# Patient Record
Sex: MALE | Race: WHITE | Employment: FULL TIME | URBAN - METROPOLITAN AREA
[De-identification: names, ages, dates, MRNs, and addresses within clinical notes are randomized per-mention and may not be internally consistent; named-entity substitution may affect disease eponyms.]

---

## 2017-03-16 ENCOUNTER — OFFICE VISIT (OUTPATIENT)
Dept: INTERNAL MEDICINE CLINIC | Facility: CLINIC | Age: 32
End: 2017-03-16

## 2017-03-16 VITALS
BODY MASS INDEX: 33.37 KG/M2 | SYSTOLIC BLOOD PRESSURE: 142 MMHG | DIASTOLIC BLOOD PRESSURE: 96 MMHG | HEART RATE: 60 BPM | TEMPERATURE: 98 F | HEIGHT: 74 IN | WEIGHT: 260 LBS

## 2017-03-16 DIAGNOSIS — M75.102 TEAR OF LEFT ROTATOR CUFF, UNSPECIFIED TEAR EXTENT: ICD-10-CM

## 2017-03-16 DIAGNOSIS — M54.12 CERVICAL RADICULOPATHY: ICD-10-CM

## 2017-03-16 DIAGNOSIS — M25.512 ACUTE PAIN OF LEFT SHOULDER: Primary | ICD-10-CM

## 2017-03-16 PROCEDURE — 99212 OFFICE O/P EST SF 10 MIN: CPT | Performed by: INTERNAL MEDICINE

## 2017-03-16 PROCEDURE — 99214 OFFICE O/P EST MOD 30 MIN: CPT | Performed by: INTERNAL MEDICINE
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
## 2017-04-06 ENCOUNTER — HOSPITAL ENCOUNTER (OUTPATIENT)
Dept: GENERAL RADIOLOGY | Facility: HOSPITAL | Age: 32
Discharge: HOME | End: 2017-04-06
Attending: INTERNAL MEDICINE
Payer: COMMERCIAL

## 2017-04-06 ENCOUNTER — HOSPITAL ENCOUNTER (OUTPATIENT)
Dept: MRI IMAGING | Facility: HOSPITAL | Age: 32
Discharge: HOME | End: 2017-04-06
Attending: INTERNAL MEDICINE
Payer: COMMERCIAL

## 2017-04-06 DIAGNOSIS — M75.102 TEAR OF LEFT ROTATOR CUFF, UNSPECIFIED TEAR EXTENT: ICD-10-CM

## 2017-04-06 DIAGNOSIS — M25.512 ACUTE PAIN OF LEFT SHOULDER: ICD-10-CM

## 2017-04-06 DIAGNOSIS — M54.12 CERVICAL RADICULOPATHY: ICD-10-CM

## 2017-04-06 PROCEDURE — 72141 MRI NECK SPINE W/O DYE: CPT

## 2017-04-06 PROCEDURE — 73221 MRI JOINT UPR EXTREM W/O DYE: CPT

## 2017-04-06 PROCEDURE — 73030 X-RAY EXAM OF SHOULDER: CPT

## 2017-04-11 ENCOUNTER — TELEPHONE (OUTPATIENT)
Dept: INTERNAL MEDICINE CLINIC | Facility: CLINIC | Age: 32
End: 2017-04-11

## 2017-04-11 NOTE — TELEPHONE ENCOUNTER
Imaging on 4/6/17 reviewed;  Xr of left shoulder shows no acute bony abnormality; MRI left should shows low-grade deltoid strain; no rotator cuff tear; MRI cervical spine minimal DJD; no neural foraminal impingement; pt called and notified of findings; disc

## 2018-06-05 ENCOUNTER — HOSPITAL ENCOUNTER (EMERGENCY)
Facility: HOSPITAL | Age: 33
Discharge: HOME | End: 2018-06-05
Attending: PHYSICIAN ASSISTANT
Payer: COMMERCIAL

## 2018-06-05 ENCOUNTER — TELEPHONE (OUTPATIENT)
Dept: INTERNAL MEDICINE CLINIC | Facility: CLINIC | Age: 33
End: 2018-06-05

## 2018-06-05 VITALS
HEART RATE: 68 BPM | DIASTOLIC BLOOD PRESSURE: 81 MMHG | OXYGEN SATURATION: 98 % | TEMPERATURE: 98 F | SYSTOLIC BLOOD PRESSURE: 140 MMHG | WEIGHT: 250 LBS | HEIGHT: 74 IN | RESPIRATION RATE: 18 BRPM | BODY MASS INDEX: 32.08 KG/M2

## 2018-06-05 DIAGNOSIS — R03.0 ELEVATED BLOOD PRESSURE READING: ICD-10-CM

## 2018-06-05 DIAGNOSIS — R04.0 EPISTAXIS: Primary | ICD-10-CM

## 2018-06-05 PROCEDURE — 99283 EMERGENCY DEPT VISIT LOW MDM: CPT

## 2018-06-05 PROCEDURE — 30901 CONTROL OF NOSEBLEED: CPT

## 2018-06-05 NOTE — TELEPHONE ENCOUNTER
Patient states he has had problems with nosebleeds in the past but today it has been about 1.5 hours and the bleeding has not stopped.  Patient states he has \"it pretty packed\" right now and is driving home but knows that if he takes the towel out it will

## 2018-06-05 NOTE — ED NOTES
Pt states has had a nose bleed for approx 2hrs. Not stopping. Has a history of nose bleeds with cauterization. Denies injury. Denies anticoagulation use.

## 2018-06-05 NOTE — ED PROVIDER NOTES
Patient Seen in: [facility] AND [facility] Emergency Department    History   Patient presents with:  Nose Bleed (nasopharyngeal)    Stated Complaint: Nose Bleed     HPI       60-year-old male presents with chief complaint of right epistaxis. Onset 2 hours ago. 0.6 - 1.2 oz/week     Standard drinks or equivalent: 1 - 2 per week      Review of Systems    Positive for stated complaint: Nose Bleed   Other systems are as noted in HPI. Constitutional and vital signs reviewed.       All other systems reviewed and negat deformity. Neurological: Gross motor movement is intact in all 4 extremities. Patient exhibits normal speech. Skin: Skin is normal to inspection. Warm and dry. No obvious bruising. No obvious rash.     ED Course   Labs Reviewed - No data to display

## 2018-06-05 NOTE — TELEPHONE ENCOUNTER
Pt has had problems with bloody noses in the past  Has one today for the last hour and a half    Transferred patient to triage

## 2018-06-05 NOTE — ED INITIAL ASSESSMENT (HPI)
Patient presents with nose bleed for the past two hours. Patient has a history of nose bleeds with cauterization.

## 2018-06-06 ENCOUNTER — OFFICE VISIT (OUTPATIENT)
Dept: INTERNAL MEDICINE CLINIC | Facility: CLINIC | Age: 33
End: 2018-06-06

## 2018-06-06 VITALS
HEIGHT: 74 IN | SYSTOLIC BLOOD PRESSURE: 140 MMHG | HEART RATE: 70 BPM | BODY MASS INDEX: 33.11 KG/M2 | DIASTOLIC BLOOD PRESSURE: 92 MMHG | TEMPERATURE: 98 F | WEIGHT: 258 LBS

## 2018-06-06 DIAGNOSIS — Z00.00 HEALTHCARE MAINTENANCE: ICD-10-CM

## 2018-06-06 DIAGNOSIS — R04.0 EPISTAXIS: Primary | ICD-10-CM

## 2018-06-06 PROCEDURE — 99213 OFFICE O/P EST LOW 20 MIN: CPT | Performed by: INTERNAL MEDICINE

## 2018-06-06 PROCEDURE — 99212 OFFICE O/P EST SF 10 MIN: CPT | Performed by: INTERNAL MEDICINE

## 2018-06-06 NOTE — PROGRESS NOTES
Svetlana Srinivasan is a 28year old male. HPI:   Patient presents with:  Nose Bleed (nasopharyngeal): Pt has had nosebleeds 2-3 x a week for a couple months. Yesterday he went to the ER for one and had to have it cauterized.        29 y/o M who has had rec oriented x3 in no acute distress  HEENT- EOMI, PERRL  Nose/Mouth/Throat: pharynx without erythema; no oral lesions  Neck/Thyroid: neck supple; no thyromegaly  Cardiovascular: RRR, S1, S2, no S3 or murmur  Respiratory: lungs without crackles or wheezes  Abd

## 2018-06-07 ENCOUNTER — LAB ENCOUNTER (OUTPATIENT)
Dept: LAB | Age: 33
End: 2018-06-07
Attending: INTERNAL MEDICINE
Payer: COMMERCIAL

## 2018-06-07 DIAGNOSIS — Z00.00 HEALTHCARE MAINTENANCE: ICD-10-CM

## 2018-06-07 DIAGNOSIS — R04.0 EPISTAXIS: ICD-10-CM

## 2018-06-07 PROCEDURE — 36415 COLL VENOUS BLD VENIPUNCTURE: CPT

## 2018-06-07 PROCEDURE — 85025 COMPLETE CBC W/AUTO DIFF WBC: CPT

## 2018-06-07 PROCEDURE — 80061 LIPID PANEL: CPT

## 2018-06-07 PROCEDURE — 80053 COMPREHEN METABOLIC PANEL: CPT

## 2018-06-07 PROCEDURE — 84443 ASSAY THYROID STIM HORMONE: CPT

## 2018-06-19 ENCOUNTER — OFFICE VISIT (OUTPATIENT)
Dept: OTOLARYNGOLOGY | Facility: CLINIC | Age: 33
End: 2018-06-19

## 2018-06-19 VITALS
DIASTOLIC BLOOD PRESSURE: 90 MMHG | SYSTOLIC BLOOD PRESSURE: 136 MMHG | HEIGHT: 74 IN | TEMPERATURE: 99 F | WEIGHT: 258 LBS | HEART RATE: 70 BPM | BODY MASS INDEX: 33.11 KG/M2

## 2018-06-19 DIAGNOSIS — R04.0 EPISTAXIS: Primary | ICD-10-CM

## 2018-06-19 PROCEDURE — 99212 OFFICE O/P EST SF 10 MIN: CPT | Performed by: OTOLARYNGOLOGY

## 2018-06-19 PROCEDURE — 30901 CONTROL OF NOSEBLEED: CPT | Performed by: OTOLARYNGOLOGY

## 2018-06-19 PROCEDURE — 99243 OFF/OP CNSLTJ NEW/EST LOW 30: CPT | Performed by: OTOLARYNGOLOGY

## 2018-06-19 NOTE — PROGRESS NOTES
Jaime Cervantes is a 28year old male. Patient presents with:  Epistaxis: Patient present for consult for c/o recurrent nose bleeds to right nostril for past 6 months every couple days. Last nose bleed was 2 nights ago in sleep.  Dull pain to right side of Normal. Parotid gland - Normal. Thyroid gland - Normal.   Psychiatric Normal Orientation - Oriented to time, place, person & situation. Appropriate mood and affect. Lymph Detail Normal Submental. Submandibular.  Anterior cervical. Posterior cervical. Supr

## 2018-06-20 ENCOUNTER — OFFICE VISIT (OUTPATIENT)
Dept: OTOLARYNGOLOGY | Facility: CLINIC | Age: 33
End: 2018-06-20

## 2018-06-20 ENCOUNTER — TELEPHONE (OUTPATIENT)
Dept: OTOLARYNGOLOGY | Facility: CLINIC | Age: 33
End: 2018-06-20

## 2018-06-20 VITALS
HEIGHT: 74 IN | TEMPERATURE: 98 F | WEIGHT: 285 LBS | BODY MASS INDEX: 36.57 KG/M2 | SYSTOLIC BLOOD PRESSURE: 152 MMHG | DIASTOLIC BLOOD PRESSURE: 93 MMHG

## 2018-06-20 DIAGNOSIS — Z01.818 PRE-OP EXAM: Primary | ICD-10-CM

## 2018-06-20 DIAGNOSIS — R04.0 EPISTAXIS: Primary | ICD-10-CM

## 2018-06-20 PROCEDURE — 99212 OFFICE O/P EST SF 10 MIN: CPT | Performed by: OTOLARYNGOLOGY

## 2018-06-20 PROCEDURE — 31231 NASAL ENDOSCOPY DX: CPT | Performed by: OTOLARYNGOLOGY

## 2018-06-20 PROCEDURE — 30901 CONTROL OF NOSEBLEED: CPT | Performed by: OTOLARYNGOLOGY

## 2018-06-20 PROCEDURE — 99213 OFFICE O/P EST LOW 20 MIN: CPT | Performed by: OTOLARYNGOLOGY

## 2018-06-20 NOTE — TELEPHONE ENCOUNTER
Late entry, Per pt pinching fleshy part of nose, pt states bleeding has stopped. Per Dr. Sophia Bay, pt to have nose cauterized in OR.  Advised pt that if nose starts to bleed again, pt to pinch fleshy part of nose, and if bleeding dose not stop after 10-15, pt

## 2018-06-20 NOTE — PROGRESS NOTES
Gorge Kearney is a 28year old male. Patient presents with:   Follow - Up: pt reports after culterization procedure yesterday had 3 nose bleeds out of right nostril, dull pain in the back of head      HISTORY OF PRESENT ILLNESS  6/20/2018  Patient preve 04/22/2011    Per [employer]   • S/P foot surgery, left 2010       Past Surgical History:  2010: MUSCULOSKELETAL SURGERY UNLISTED Left      Comment: left foot surgery      REVIEW OF SYSTEMS    System Neg/Pos Details   Constitutional Negative Fatigue, fever an noted. Prominent vessels are  noted . Active bleeding is noted    PROCEDURE: right nasal endoscopy  performed with topical lidocaine and oxymetazoline. After discussing indication's and potential side effects.  The patient stated that they understood an the fact that he has had 4 different visits for these nosebleeds and has been cauterized 4 times in the office with limited success I recommend we proceed with nasal endoscopy exam under anesthesia and possible electrocautery should he have any further ble

## 2018-06-20 NOTE — TELEPHONE ENCOUNTER
pt called. He was seen yesterday and today to have nose cauterized. pt states his nose is bleeding again. pt advised to call if this did happen. Thank you.

## 2018-06-21 ENCOUNTER — APPOINTMENT (OUTPATIENT)
Dept: LAB | Facility: HOSPITAL | Age: 33
End: 2018-06-21
Attending: OTOLARYNGOLOGY
Payer: COMMERCIAL

## 2018-06-21 DIAGNOSIS — Z01.818 PRE-OP EXAM: ICD-10-CM

## 2018-06-21 PROCEDURE — 85610 PROTHROMBIN TIME: CPT

## 2018-06-21 PROCEDURE — 85730 THROMBOPLASTIN TIME PARTIAL: CPT

## 2018-06-21 PROCEDURE — 85027 COMPLETE CBC AUTOMATED: CPT

## 2018-06-21 PROCEDURE — 80048 BASIC METABOLIC PNL TOTAL CA: CPT

## 2018-06-21 PROCEDURE — 36415 COLL VENOUS BLD VENIPUNCTURE: CPT

## 2018-06-21 NOTE — H&P
Jamal Naranjo is a 28year old male. Patient presents with:   Follow - Up: pt reports after culterization procedure yesterday had 3 nose bleeds out of right nostril, dull pain in the back of head        HISTORY OF PRESENT ILLNESS  6/20/2018  Patient pre age 44)              Past Medical History:   Diagnosis Date   • Essential hypertension     • Obesity 04/22/2011     Per [employer]   • S/P foot surgery, left 2010         Past Surgical History:  2010: MUSCULOSKELETAL SURGERY UNLISTED Left      Comment: left f Nose/Mouth/Throat abNormal  Normal external appearance of the nose septum is deviated  and turbinates are normal with no polyps noted. Prominent vessels are  noted .  Active bleeding is noted    PROCEDURE: right nasal endoscopy  performed with topical lid him to purchase some Afrin nasal spray and should he have another nosebleed soak a cotton ball and put into the affected nares.   Given the fact that he has had 4 different visits for these nosebleeds and has been cauterized 4 times in the office with limit

## 2018-06-22 ENCOUNTER — ANESTHESIA EVENT (OUTPATIENT)
Dept: SURGERY | Facility: HOSPITAL | Age: 33
End: 2018-06-22
Payer: COMMERCIAL

## 2018-06-22 ENCOUNTER — SURGERY (OUTPATIENT)
Age: 33
End: 2018-06-22

## 2018-06-22 ENCOUNTER — TELEPHONE (OUTPATIENT)
Dept: INTERNAL MEDICINE CLINIC | Facility: CLINIC | Age: 33
End: 2018-06-22

## 2018-06-22 ENCOUNTER — ANESTHESIA (OUTPATIENT)
Dept: SURGERY | Facility: HOSPITAL | Age: 33
End: 2018-06-22
Payer: COMMERCIAL

## 2018-06-22 ENCOUNTER — HOSPITAL ENCOUNTER (OUTPATIENT)
Facility: HOSPITAL | Age: 33
Setting detail: HOSPITAL OUTPATIENT SURGERY
Discharge: HOME | End: 2018-06-22
Attending: OTOLARYNGOLOGY | Admitting: OTOLARYNGOLOGY
Payer: COMMERCIAL

## 2018-06-22 VITALS
HEIGHT: 74 IN | RESPIRATION RATE: 13 BRPM | SYSTOLIC BLOOD PRESSURE: 132 MMHG | BODY MASS INDEX: 33.11 KG/M2 | DIASTOLIC BLOOD PRESSURE: 81 MMHG | TEMPERATURE: 98 F | WEIGHT: 258 LBS | HEART RATE: 60 BPM | OXYGEN SATURATION: 99 %

## 2018-06-22 DIAGNOSIS — R04.0 EPISTAXIS: ICD-10-CM

## 2018-06-22 PROCEDURE — 30930 THER FX NASAL INF TURBINATE: CPT | Performed by: OTOLARYNGOLOGY

## 2018-06-22 PROCEDURE — 31238 NSL/SINS NDSC SRG NSL HEMRRG: CPT | Performed by: OTOLARYNGOLOGY

## 2018-06-22 PROCEDURE — 0W3Q8ZZ CONTROL BLEEDING IN RESPIRATORY TRACT, VIA NATURAL OR ARTIFICIAL OPENING ENDOSCOPIC: ICD-10-PCS | Performed by: OTOLARYNGOLOGY

## 2018-06-22 PROCEDURE — 09SL7ZZ REPOSITION NASAL TURBINATE, VIA NATURAL OR ARTIFICIAL OPENING: ICD-10-PCS | Performed by: OTOLARYNGOLOGY

## 2018-06-22 RX ORDER — HYDROMORPHONE HYDROCHLORIDE 4 MG/1
4 TABLET ORAL EVERY 2 HOUR PRN
Status: DISCONTINUED | OUTPATIENT
Start: 2018-06-22 | End: 2018-06-22

## 2018-06-22 RX ORDER — LIDOCAINE HYDROCHLORIDE 10 MG/ML
INJECTION, SOLUTION EPIDURAL; INFILTRATION; INTRACAUDAL; PERINEURAL AS NEEDED
Status: DISCONTINUED | OUTPATIENT
Start: 2018-06-22 | End: 2018-06-22 | Stop reason: SURG

## 2018-06-22 RX ORDER — ONDANSETRON 2 MG/ML
4 INJECTION INTRAMUSCULAR; INTRAVENOUS EVERY 6 HOURS PRN
Status: DISCONTINUED | OUTPATIENT
Start: 2018-06-22 | End: 2018-06-22

## 2018-06-22 RX ORDER — NALOXONE HYDROCHLORIDE 0.4 MG/ML
80 INJECTION, SOLUTION INTRAMUSCULAR; INTRAVENOUS; SUBCUTANEOUS AS NEEDED
Status: DISCONTINUED | OUTPATIENT
Start: 2018-06-22 | End: 2018-06-22

## 2018-06-22 RX ORDER — HYDROCODONE BITARTRATE AND ACETAMINOPHEN 5; 325 MG/1; MG/1
1 TABLET ORAL AS NEEDED
Status: DISCONTINUED | OUTPATIENT
Start: 2018-06-22 | End: 2018-06-22

## 2018-06-22 RX ORDER — HYDROMORPHONE HYDROCHLORIDE 2 MG/1
1 TABLET ORAL EVERY 2 HOUR PRN
Status: DISCONTINUED | OUTPATIENT
Start: 2018-06-22 | End: 2018-06-22

## 2018-06-22 RX ORDER — HYDROMORPHONE HYDROCHLORIDE 1 MG/ML
0.4 INJECTION, SOLUTION INTRAMUSCULAR; INTRAVENOUS; SUBCUTANEOUS
Status: DISCONTINUED | OUTPATIENT
Start: 2018-06-22 | End: 2018-06-22

## 2018-06-22 RX ORDER — FAMOTIDINE 20 MG/1
20 TABLET ORAL ONCE
Status: COMPLETED | OUTPATIENT
Start: 2018-06-22 | End: 2018-06-22

## 2018-06-22 RX ORDER — ONDANSETRON 4 MG/1
4 TABLET, ORALLY DISINTEGRATING ORAL EVERY 6 HOURS PRN
Status: DISCONTINUED | OUTPATIENT
Start: 2018-06-22 | End: 2018-06-22

## 2018-06-22 RX ORDER — ONDANSETRON 2 MG/ML
INJECTION INTRAMUSCULAR; INTRAVENOUS AS NEEDED
Status: DISCONTINUED | OUTPATIENT
Start: 2018-06-22 | End: 2018-06-22 | Stop reason: SURG

## 2018-06-22 RX ORDER — HYDROMORPHONE HYDROCHLORIDE 2 MG/1
2 TABLET ORAL EVERY 2 HOUR PRN
Status: DISCONTINUED | OUTPATIENT
Start: 2018-06-22 | End: 2018-06-22

## 2018-06-22 RX ORDER — METOCLOPRAMIDE 10 MG/1
10 TABLET ORAL ONCE
Status: COMPLETED | OUTPATIENT
Start: 2018-06-22 | End: 2018-06-22

## 2018-06-22 RX ORDER — SODIUM CHLORIDE 0.9 % (FLUSH) 0.9 %
10 SYRINGE (ML) INJECTION AS NEEDED
Status: DISCONTINUED | OUTPATIENT
Start: 2018-06-22 | End: 2018-06-22

## 2018-06-22 RX ORDER — ACETAMINOPHEN 500 MG
1000 TABLET ORAL ONCE
Status: COMPLETED | OUTPATIENT
Start: 2018-06-22 | End: 2018-06-22

## 2018-06-22 RX ORDER — HYDROMORPHONE HYDROCHLORIDE 1 MG/ML
0.6 INJECTION, SOLUTION INTRAMUSCULAR; INTRAVENOUS; SUBCUTANEOUS EVERY 5 MIN PRN
Status: DISCONTINUED | OUTPATIENT
Start: 2018-06-22 | End: 2018-06-22

## 2018-06-22 RX ORDER — DEXAMETHASONE SODIUM PHOSPHATE 4 MG/ML
VIAL (ML) INJECTION AS NEEDED
Status: DISCONTINUED | OUTPATIENT
Start: 2018-06-22 | End: 2018-06-22 | Stop reason: SURG

## 2018-06-22 RX ORDER — SODIUM CHLORIDE, SODIUM LACTATE, POTASSIUM CHLORIDE, CALCIUM CHLORIDE 600; 310; 30; 20 MG/100ML; MG/100ML; MG/100ML; MG/100ML
INJECTION, SOLUTION INTRAVENOUS CONTINUOUS
Status: DISCONTINUED | OUTPATIENT
Start: 2018-06-22 | End: 2018-06-22

## 2018-06-22 RX ORDER — ONDANSETRON 2 MG/ML
4 INJECTION INTRAMUSCULAR; INTRAVENOUS ONCE AS NEEDED
Status: DISCONTINUED | OUTPATIENT
Start: 2018-06-22 | End: 2018-06-22

## 2018-06-22 RX ORDER — HALOPERIDOL 5 MG/ML
0.25 INJECTION INTRAMUSCULAR ONCE AS NEEDED
Status: DISCONTINUED | OUTPATIENT
Start: 2018-06-22 | End: 2018-06-22

## 2018-06-22 RX ORDER — ACETAMINOPHEN 160 MG/5ML
650 SOLUTION ORAL EVERY 4 HOURS PRN
Status: DISCONTINUED | OUTPATIENT
Start: 2018-06-22 | End: 2018-06-22

## 2018-06-22 RX ORDER — ACETAMINOPHEN 325 MG/1
650 TABLET ORAL EVERY 4 HOURS PRN
Status: DISCONTINUED | OUTPATIENT
Start: 2018-06-22 | End: 2018-06-22

## 2018-06-22 RX ORDER — HYDROMORPHONE HYDROCHLORIDE 1 MG/ML
0.4 INJECTION, SOLUTION INTRAMUSCULAR; INTRAVENOUS; SUBCUTANEOUS EVERY 5 MIN PRN
Status: DISCONTINUED | OUTPATIENT
Start: 2018-06-22 | End: 2018-06-22

## 2018-06-22 RX ORDER — HYDROCODONE BITARTRATE AND ACETAMINOPHEN 5; 325 MG/1; MG/1
1 TABLET ORAL EVERY 4 HOURS PRN
Status: DISCONTINUED | OUTPATIENT
Start: 2018-06-22 | End: 2018-06-22

## 2018-06-22 RX ORDER — HYDROCODONE BITARTRATE AND ACETAMINOPHEN 5; 325 MG/1; MG/1
2 TABLET ORAL AS NEEDED
Status: DISCONTINUED | OUTPATIENT
Start: 2018-06-22 | End: 2018-06-22

## 2018-06-22 RX ORDER — MIDAZOLAM HYDROCHLORIDE 1 MG/ML
INJECTION INTRAMUSCULAR; INTRAVENOUS AS NEEDED
Status: DISCONTINUED | OUTPATIENT
Start: 2018-06-22 | End: 2018-06-22 | Stop reason: SURG

## 2018-06-22 RX ORDER — HYDROMORPHONE HYDROCHLORIDE 1 MG/ML
0.2 INJECTION, SOLUTION INTRAMUSCULAR; INTRAVENOUS; SUBCUTANEOUS EVERY 5 MIN PRN
Status: DISCONTINUED | OUTPATIENT
Start: 2018-06-22 | End: 2018-06-22

## 2018-06-22 RX ADMIN — SODIUM CHLORIDE, SODIUM LACTATE, POTASSIUM CHLORIDE, CALCIUM CHLORIDE: 600; 310; 30; 20 INJECTION, SOLUTION INTRAVENOUS at 09:20:00

## 2018-06-22 RX ADMIN — ONDANSETRON 4 MG: 2 INJECTION INTRAMUSCULAR; INTRAVENOUS at 09:21:00

## 2018-06-22 RX ADMIN — SODIUM CHLORIDE, SODIUM LACTATE, POTASSIUM CHLORIDE, CALCIUM CHLORIDE: 600; 310; 30; 20 INJECTION, SOLUTION INTRAVENOUS at 09:35:00

## 2018-06-22 RX ADMIN — MIDAZOLAM HYDROCHLORIDE 2 MG: 1 INJECTION INTRAMUSCULAR; INTRAVENOUS at 09:21:00

## 2018-06-22 RX ADMIN — DEXAMETHASONE SODIUM PHOSPHATE 4 MG: 4 MG/ML VIAL (ML) INJECTION at 09:21:00

## 2018-06-22 RX ADMIN — LIDOCAINE HYDROCHLORIDE 50 MG: 10 INJECTION, SOLUTION EPIDURAL; INFILTRATION; INTRACAUDAL; PERINEURAL at 09:21:00

## 2018-06-22 NOTE — OPERATIVE REPORT
6/22/2018    Harshil Smith      PREOPERATIVE DIAGNOSIS: recurrent epistaxis  POSTOPERATIVE DIAGNOSIS: Same. OPERATIVE PROCEDURE:   eua with bilateral nasal endoscopy and control of epistaxis  ATTENDING SURGEON:   Tenzin Elkins M.D.     ANESTHESIA:  G was 10 cc

## 2018-06-22 NOTE — ANESTHESIA POSTPROCEDURE EVALUATION
Patient: Grant Avina    Procedure Summary     Date:  06/22/18 Room / Location:  [address] [place] [address] MAIN OR 02 / [address] [place] [address] OR    Anesthesia Start:  3217 Anesthesia Stop:      Procedure:  NASAL EPISTAXIS (Bilateral Nose) Diagnosis:       Epistaxis      (Epistaxis [R04.

## 2018-06-22 NOTE — TELEPHONE ENCOUNTER
Pat Sarkar from day surgery pre-op at [facility] to let Dr Susan Gray know that the pt had high blood pressure during surgery. Best call back is [phone]. Tasked to nursing.

## 2018-06-22 NOTE — INTERVAL H&P NOTE
Pre-op Diagnosis: Epistaxis [R04.0]    The above referenced H&P was reviewed by Theresa Mccoy MD on 6/22/2018, the patient was examined and no significant changes have occurred in the patient's condition since the H&P was performed.   I discussed with the p

## 2018-06-22 NOTE — ANESTHESIA POSTPROCEDURE EVALUATION
Patient: Sandi David    Procedure Summary     Date:  06/22/18 Room / Location:  [address] [place] [address] MAIN OR 02 / [address] [facility] MAIN OR    Anesthesia Start:  2963 Anesthesia Stop:      Procedure:  NASAL EPISTAXIS (Bilateral Nose) Diagnosis:       Epistaxis      (Epistaxis [R04.

## 2018-06-22 NOTE — TELEPHONE ENCOUNTER
2nd call from [address]  Pt getting ready to go home  Blood pressure is normal 120/78  Should pt follow up with you at the office?     Dr Odilon Nuñez would like your advice on this       [address] X [phone]

## 2018-06-22 NOTE — ANESTHESIA PROCEDURE NOTES
Airway  Date/Time: 6/22/2018 9:34 AM  Urgency: elective    Airway not difficult    General Information and Staff    Patient location during procedure: OR  Anesthesiologist: Shaneka Max  Resident/CRNA: Deryl Cranker  Performed: anesthesiologist a

## 2018-06-22 NOTE — CONSULTS
Hospitalist was called per order: to consult hospitalist by Dr. Randy Marsh;  patient's bp was running high during surgery and in PACU.   Spoke with hospitalist Dr. Carlee Lee and states that patient should be seen by his primary MD. Dr. Renetta Poe office was later c

## 2018-06-22 NOTE — OR NURSING
Took over care from [place]. Patient's primary doctor,Doctor Francisco, called back and this RN gave patient's recent vital signs and reason for consult by Dr Eldon Correia.  Doctor Franck Caballero stated patient may be discharged and should follow up with him within the next

## 2018-06-22 NOTE — ANESTHESIA PREPROCEDURE EVALUATION
Anesthesia PreOp Note    HPI:     Svetlana Srinivasan is a 28year old male who presents for preoperative consultation requested by: Kd Youssef MD    Date of Surgery: 6/22/2018    Procedure(s):  NASAL EPISTAXIS  Indication: Epistaxis [R04.0]    Relevant P labs reviewed.     Lab Results  Component Value Date   WBC 6.1 06/21/2018   RBC 4.23 (L) 06/21/2018   HGB 13.8 06/21/2018   HCT 40.6 (L) 06/21/2018   MCV 96.1 06/21/2018   MCH 32.6 (H) 06/21/2018   MCHC 33.9 06/21/2018   RDW 13.7 06/21/2018    06/21/

## 2018-06-25 ENCOUNTER — TELEPHONE (OUTPATIENT)
Dept: OTOLARYNGOLOGY | Facility: CLINIC | Age: 33
End: 2018-06-25

## 2018-06-25 NOTE — TELEPHONE ENCOUNTER
Pt is post op day 3 for endoscopic control of epistaxis in OR. Per pt is doing well, no bleeding. Per  pt is not to blow nose until seen post op, pt to start usig OTC saline nasal spray on Wednesday .  Pt advised to contact our office if symptoms

## 2018-06-28 ENCOUNTER — OFFICE VISIT (OUTPATIENT)
Dept: OTOLARYNGOLOGY | Facility: CLINIC | Age: 33
End: 2018-06-28

## 2018-06-28 VITALS
TEMPERATURE: 98 F | DIASTOLIC BLOOD PRESSURE: 92 MMHG | WEIGHT: 258 LBS | SYSTOLIC BLOOD PRESSURE: 143 MMHG | BODY MASS INDEX: 33.11 KG/M2 | HEIGHT: 74 IN

## 2018-06-28 DIAGNOSIS — R04.0 EPISTAXIS: Primary | ICD-10-CM

## 2018-06-28 PROCEDURE — 99212 OFFICE O/P EST SF 10 MIN: CPT | Performed by: OTOLARYNGOLOGY

## 2018-06-28 PROCEDURE — 99024 POSTOP FOLLOW-UP VISIT: CPT | Performed by: OTOLARYNGOLOGY

## 2018-06-28 RX ORDER — ACETAMINOPHEN 500 MG
1000 TABLET ORAL EVERY 6 HOURS PRN
COMMUNITY
End: 2018-08-09

## 2018-06-28 NOTE — PROGRESS NOTES
Eugene Dumonter is a 28year old male.  Patient presents with:  Post-Op: endo control of epistaxis done on 6-22, c/o pain inside nose            Social History  Social History   Marital status:   Spouse name: N/A    Years of education: N/A  Number o

## 2018-07-03 ENCOUNTER — HOSPITAL ENCOUNTER (EMERGENCY)
Facility: HOSPITAL | Age: 33
Discharge: HOME | End: 2018-07-03
Attending: EMERGENCY MEDICINE
Payer: COMMERCIAL

## 2018-07-03 ENCOUNTER — TELEPHONE (OUTPATIENT)
Dept: OTOLARYNGOLOGY | Facility: CLINIC | Age: 33
End: 2018-07-03

## 2018-07-03 VITALS
TEMPERATURE: 98 F | BODY MASS INDEX: 33.11 KG/M2 | HEIGHT: 74 IN | DIASTOLIC BLOOD PRESSURE: 87 MMHG | SYSTOLIC BLOOD PRESSURE: 148 MMHG | RESPIRATION RATE: 18 BRPM | WEIGHT: 258 LBS | HEART RATE: 85 BPM | OXYGEN SATURATION: 98 %

## 2018-07-03 DIAGNOSIS — R04.0 EPISTAXIS: Primary | ICD-10-CM

## 2018-07-03 PROCEDURE — 30903 CONTROL OF NOSEBLEED: CPT

## 2018-07-03 PROCEDURE — 99283 EMERGENCY DEPT VISIT LOW MDM: CPT

## 2018-07-03 RX ORDER — ACETAMINOPHEN AND CODEINE PHOSPHATE 300; 30 MG/1; MG/1
1-2 TABLET ORAL EVERY 4 HOURS PRN
Qty: 10 TABLET | Refills: 0 | Status: SHIPPED | OUTPATIENT
Start: 2018-07-03 | End: 2018-07-10

## 2018-07-03 RX ORDER — CEFUROXIME AXETIL 500 MG/1
250 TABLET ORAL 2 TIMES DAILY
Qty: 14 TABLET | Refills: 0 | Status: SHIPPED | OUTPATIENT
Start: 2018-07-03 | End: 2018-08-09

## 2018-07-03 NOTE — TELEPHONE ENCOUNTER
Patient seen Halina Jackson on 06/29 and had nose cauterized. Patient states nose is currently bleeding again.  Requesting to speak with RN

## 2018-07-03 NOTE — TELEPHONE ENCOUNTER
Per pt states- blood is pouring out of nostril for past half hour, bright red blood. Advised pt will need to go to ER. Per pt will have family take. Pt agreed with triage advised and   informed and no further recommendations.

## 2018-07-03 NOTE — ED PROVIDER NOTES
Patient Seen in: [facility] AND [facility] Emergency Department    History   Patient presents with:  Nose Bleed (nasopharyngeal)    Stated Complaint: nose bleed    HPI    Patient is a 80-year-old male who presents to the emergency department with a chief complai hematoma. Epistaxis is observed. Eyes: Conjunctivae and EOM are normal. Pupils are equal, round, and reactive to light. Neck: Neck supple. Cardiovascular: Normal rate, regular rhythm and normal heart sounds.     Pulmonary/Chest: Effort normal.   [facility]

## 2018-07-03 NOTE — ED INITIAL ASSESSMENT (HPI)
bilat nose bleed, pt had surgery on R nare 10 days ago due to frequent nose bleeds, nose starting bleeding spontaneously this AM, pt unable to control at home, direct pressure applied at this time, pt states bld is dripping down throat.   Pt not on bld thin

## 2018-07-05 ENCOUNTER — TELEPHONE (OUTPATIENT)
Dept: OTOLARYNGOLOGY | Facility: CLINIC | Age: 33
End: 2018-07-05

## 2018-07-05 ENCOUNTER — OFFICE VISIT (OUTPATIENT)
Dept: INTERNAL MEDICINE CLINIC | Facility: CLINIC | Age: 33
End: 2018-07-05

## 2018-07-05 VITALS
HEIGHT: 74 IN | OXYGEN SATURATION: 98 % | TEMPERATURE: 99 F | WEIGHT: 254 LBS | DIASTOLIC BLOOD PRESSURE: 76 MMHG | RESPIRATION RATE: 18 BRPM | BODY MASS INDEX: 32.6 KG/M2 | SYSTOLIC BLOOD PRESSURE: 132 MMHG | HEART RATE: 68 BPM

## 2018-07-05 DIAGNOSIS — R04.0 EPISTAXIS: ICD-10-CM

## 2018-07-05 DIAGNOSIS — I10 BENIGN HYPERTENSION: Primary | ICD-10-CM

## 2018-07-05 PROCEDURE — 99214 OFFICE O/P EST MOD 30 MIN: CPT | Performed by: INTERNAL MEDICINE

## 2018-07-05 PROCEDURE — 99212 OFFICE O/P EST SF 10 MIN: CPT | Performed by: INTERNAL MEDICINE

## 2018-07-05 RX ORDER — LISINOPRIL AND HYDROCHLOROTHIAZIDE 12.5; 1 MG/1; MG/1
1 TABLET ORAL DAILY
Qty: 30 TABLET | Refills: 5 | Status: SHIPPED | OUTPATIENT
Start: 2018-07-05 | End: 2018-11-28

## 2018-07-05 NOTE — TELEPHONE ENCOUNTER
Pt scheduled thru mychart appt 12-5-18 with dr Osmel Dsouza to remove nose packing due to a nosebleed. Pt contacted. Pt stated pt removed the packing himself on 7-4-18 and has appt with pcp today. Pt cxl appt with dr Osmel Dsouza.

## 2018-07-05 NOTE — PROGRESS NOTES
Tracy Souza is a 28year old male.     HPI:   Patient presents with:  HTN: Follow up      29 y/o M with elevated BP and recurrent epistaxis here fro F/U; pt had procedure under care of ENT Dr Leena Reed about 2 weeks ago in effort to control epistaxis; pt needed for Pain.  Disp: 10 tablet Rfl: 0       Allergies:    Penicillins             NAUSEA AND VOMITING              ROS:   Constitutional: no weight loss; no fatigue  Cardiovascular:  Negative for chest pain; negative palpitations  Respiratory:  Negative

## 2018-07-05 NOTE — TELEPHONE ENCOUNTER
, patient of [facility] who had control of epistaxis in OR on 06/28. Pt seen in [address] [place] [address] ER on 07/03 for nose bleed and rhino rocket placed. Pt removed packing on his own yesterday as he states he could not take the \"pain\" .  Pt states when he removed

## 2018-08-09 ENCOUNTER — OFFICE VISIT (OUTPATIENT)
Dept: INTERNAL MEDICINE CLINIC | Facility: CLINIC | Age: 33
End: 2018-08-09
Payer: COMMERCIAL

## 2018-08-09 VITALS
TEMPERATURE: 98 F | HEIGHT: 74 IN | SYSTOLIC BLOOD PRESSURE: 124 MMHG | WEIGHT: 251 LBS | HEART RATE: 70 BPM | BODY MASS INDEX: 32.21 KG/M2 | DIASTOLIC BLOOD PRESSURE: 80 MMHG

## 2018-08-09 DIAGNOSIS — I10 BENIGN HYPERTENSION: Primary | ICD-10-CM

## 2018-08-09 PROCEDURE — 99212 OFFICE O/P EST SF 10 MIN: CPT | Performed by: INTERNAL MEDICINE

## 2018-08-09 PROCEDURE — 99213 OFFICE O/P EST LOW 20 MIN: CPT | Performed by: INTERNAL MEDICINE

## 2018-08-09 NOTE — PROGRESS NOTES
Reanna Salmeron is a 28year old male. HPI:   Patient presents with: Follow - Up: Pt is here to follow up on hypertension.  He is doing well.       27 y/o M with HTN here for F/U;  no CP; no SOB; no headaches; no palpitation; no epistaxis; tolerating l (113.9 kg).   Constitutional: alert and oriented x3 in no acute distress  HEENT- EOMI, PERRL  Nose/Mouth/Throat: pharynx without erythema; no oral lesions  Neck/Thyroid: neck supple; no thyromegaly  Cardiovascular: RRR, S1, S2, no S3 or murmur  Respiratory:

## 2018-11-27 ENCOUNTER — TELEPHONE (OUTPATIENT)
Dept: INTERNAL MEDICINE CLINIC | Facility: CLINIC | Age: 33
End: 2018-11-27

## 2018-11-28 NOTE — TELEPHONE ENCOUNTER
Refill sent 7/5/18 for 6 month supply to [facility]. Patient should still have refills available at pharmacy. Called and spoke to patient. He states Dr Matti Webster told him it was ok to take more than 1 pill a day if he felt like he needed to.    He states h

## 2018-11-29 RX ORDER — LISINOPRIL AND HYDROCHLOROTHIAZIDE 12.5; 1 MG/1; MG/1
1 TABLET ORAL DAILY
Qty: 90 TABLET | Refills: 3 | Status: SHIPPED | OUTPATIENT
Start: 2018-11-29 | End: 2019-02-07

## 2019-02-07 ENCOUNTER — OFFICE VISIT (OUTPATIENT)
Dept: INTERNAL MEDICINE CLINIC | Facility: CLINIC | Age: 34
End: 2019-02-07
Payer: COMMERCIAL

## 2019-02-07 ENCOUNTER — APPOINTMENT (OUTPATIENT)
Dept: LAB | Age: 34
End: 2019-02-07
Attending: INTERNAL MEDICINE
Payer: COMMERCIAL

## 2019-02-07 VITALS
TEMPERATURE: 98 F | BODY MASS INDEX: 32.67 KG/M2 | WEIGHT: 249.19 LBS | HEART RATE: 80 BPM | DIASTOLIC BLOOD PRESSURE: 84 MMHG | HEIGHT: 73.2 IN | SYSTOLIC BLOOD PRESSURE: 130 MMHG

## 2019-02-07 DIAGNOSIS — I10 BENIGN HYPERTENSION: Primary | ICD-10-CM

## 2019-02-07 DIAGNOSIS — I10 BENIGN HYPERTENSION: ICD-10-CM

## 2019-02-07 DIAGNOSIS — R04.0 EPISTAXIS: ICD-10-CM

## 2019-02-07 LAB
ANION GAP SERPL CALC-SCNC: 9 MMOL/L (ref 0–18)
BUN SERPL-MCNC: 24 MG/DL (ref 8–20)
BUN/CREAT SERPL: 27.6 (ref 10–20)
CALCIUM SERPL-MCNC: 9.5 MG/DL (ref 8.5–10.5)
CHLORIDE SERPL-SCNC: 102 MMOL/L (ref 95–110)
CO2 SERPL-SCNC: 25 MMOL/L (ref 22–32)
CREAT SERPL-MCNC: 0.87 MG/DL (ref 0.5–1.5)
GLUCOSE SERPL-MCNC: 96 MG/DL (ref 70–99)
OSMOLALITY UR CALC.SUM OF ELEC: 286 MOSM/KG (ref 275–295)
POTASSIUM SERPL-SCNC: 4.6 MMOL/L (ref 3.3–5.1)
SODIUM SERPL-SCNC: 136 MMOL/L (ref 136–144)

## 2019-02-07 PROCEDURE — 36415 COLL VENOUS BLD VENIPUNCTURE: CPT

## 2019-02-07 PROCEDURE — 80048 BASIC METABOLIC PNL TOTAL CA: CPT

## 2019-02-07 PROCEDURE — 99212 OFFICE O/P EST SF 10 MIN: CPT | Performed by: INTERNAL MEDICINE

## 2019-02-07 PROCEDURE — 99214 OFFICE O/P EST MOD 30 MIN: CPT | Performed by: INTERNAL MEDICINE

## 2019-02-07 RX ORDER — LISINOPRIL AND HYDROCHLOROTHIAZIDE 25; 20 MG/1; MG/1
1 TABLET ORAL DAILY
Qty: 30 TABLET | Refills: 5 | Status: SHIPPED | OUTPATIENT
Start: 2019-02-07 | End: 2019-09-02

## 2019-02-07 NOTE — PROGRESS NOTES
Ramez Granados is a 35year old male.     HPI:   Patient presents with:  Checkup: 6 month check up  ,F/U on BP      36 y/o M with HTN here for F/U;  on lisinopril/ HCTZ 10/12.5 mg po qD; has been increasing to BID when has headache;  no CP; no SOB; no hea Blood pressure 130/84, pulse 80, temperature 98 °F (36.7 °C), temperature source Oral, height 6' 1.2\" (1.859 m), weight 249 lb 3.2 oz (113 kg).   Constitutional: alert and oriented x3 in no acute distress  HEENT- EOMI, PERRL  Nose/Mouth/Throat: pharynx w

## 2019-09-03 RX ORDER — LISINOPRIL AND HYDROCHLOROTHIAZIDE 25; 20 MG/1; MG/1
1 TABLET ORAL DAILY
Qty: 90 TABLET | Refills: 1 | Status: SHIPPED | OUTPATIENT
Start: 2019-09-03 | End: 2020-02-10

## 2019-11-19 NOTE — TELEPHONE ENCOUNTER
Patient called requesting 4 tabs of Amoxicillin for her dental appointment that she has coming up. Patient stated that you have sent it for her in the past.     Patient also asked if She needs to be seen for a visit for her Cholesterol check of if you want her to just do a Lab Only? Last OV 8/3/19    Pending Prescriptions:                       Disp   Refills    amoxicillin (AMOXIL) 500 MG capsule       4 caps*0            Sig: Take 4 capsules 1 hour prior to dental work.       Refilled lisinopril/CTZ 10/12.5 mg po qD #90, 3RF; ERx sent; please call pt

## 2020-02-09 ENCOUNTER — TELEPHONE (OUTPATIENT)
Dept: INTERNAL MEDICINE CLINIC | Facility: CLINIC | Age: 35
End: 2020-02-09

## 2020-02-09 DIAGNOSIS — I10 BENIGN HYPERTENSION: Primary | ICD-10-CM

## 2020-02-09 NOTE — TELEPHONE ENCOUNTER
Dr Ahsan Harrison please place lab orders and advise on refill as pt does not meet protocol.  Then to  to make appointment

## 2020-02-10 RX ORDER — LISINOPRIL AND HYDROCHLOROTHIAZIDE 25; 20 MG/1; MG/1
1 TABLET ORAL DAILY
Qty: 30 TABLET | Refills: 0 | Status: SHIPPED | OUTPATIENT
Start: 2020-02-10 | End: 2020-02-13

## 2020-02-11 NOTE — TELEPHONE ENCOUNTER
Called patient and relayed DR. FAIRBANKS message - verbalized understanding . Has sea with DR. FAIRBANKS Thursday 2/13-reminded patient of getting his blood work done before

## 2020-02-11 NOTE — TELEPHONE ENCOUNTER
ERx sent for lisinopril 20/25 mg po qd #30, no RF    Pt is due for physical exam; Fasting labs ordered; please call patient to schedule physical exam

## 2020-02-12 ENCOUNTER — LAB ENCOUNTER (OUTPATIENT)
Dept: LAB | Facility: HOSPITAL | Age: 35
End: 2020-02-12
Attending: INTERNAL MEDICINE
Payer: COMMERCIAL

## 2020-02-12 DIAGNOSIS — I10 BENIGN HYPERTENSION: ICD-10-CM

## 2020-02-12 LAB
ALBUMIN SERPL-MCNC: 4.7 G/DL (ref 3.4–5)
ALBUMIN/GLOB SERPL: 1.4 {RATIO} (ref 1–2)
ALP LIVER SERPL-CCNC: 85 U/L (ref 45–117)
ALT SERPL-CCNC: 17 U/L (ref 16–61)
ANION GAP SERPL CALC-SCNC: 6 MMOL/L (ref 0–18)
AST SERPL-CCNC: 17 U/L (ref 15–37)
BASOPHILS # BLD AUTO: 0.04 X10(3) UL (ref 0–0.2)
BASOPHILS NFR BLD AUTO: 0.3 %
BILIRUB SERPL-MCNC: 1.2 MG/DL (ref 0.1–2)
BUN BLD-MCNC: 20 MG/DL (ref 7–18)
BUN/CREAT SERPL: 23 (ref 10–20)
CALCIUM BLD-MCNC: 9.6 MG/DL (ref 8.5–10.1)
CHLORIDE SERPL-SCNC: 102 MMOL/L (ref 98–112)
CHOLEST SMN-MCNC: 134 MG/DL (ref ?–200)
CO2 SERPL-SCNC: 28 MMOL/L (ref 21–32)
CREAT BLD-MCNC: 0.87 MG/DL (ref 0.7–1.3)
DEPRECATED RDW RBC AUTO: 45 FL (ref 35.1–46.3)
EOSINOPHIL # BLD AUTO: 0.27 X10(3) UL (ref 0–0.7)
EOSINOPHIL NFR BLD AUTO: 2.3 %
ERYTHROCYTE [DISTWIDTH] IN BLOOD BY AUTOMATED COUNT: 12.7 % (ref 11–15)
GLOBULIN PLAS-MCNC: 3.4 G/DL (ref 2.8–4.4)
GLUCOSE BLD-MCNC: 81 MG/DL (ref 70–99)
HCT VFR BLD AUTO: 42.2 % (ref 39–53)
HDLC SERPL-MCNC: 53 MG/DL (ref 40–59)
HGB BLD-MCNC: 14.5 G/DL (ref 13–17.5)
IMM GRANULOCYTES # BLD AUTO: 0.03 X10(3) UL (ref 0–1)
IMM GRANULOCYTES NFR BLD: 0.3 %
LDLC SERPL CALC-MCNC: 66 MG/DL (ref ?–100)
LYMPHOCYTES # BLD AUTO: 3.36 X10(3) UL (ref 1–4)
LYMPHOCYTES NFR BLD AUTO: 29 %
M PROTEIN MFR SERPL ELPH: 8.1 G/DL (ref 6.4–8.2)
MCH RBC QN AUTO: 33.3 PG (ref 26–34)
MCHC RBC AUTO-ENTMCNC: 34.4 G/DL (ref 31–37)
MCV RBC AUTO: 96.8 FL (ref 80–100)
MONOCYTES # BLD AUTO: 0.8 X10(3) UL (ref 0.1–1)
MONOCYTES NFR BLD AUTO: 6.9 %
NEUTROPHILS # BLD AUTO: 7.1 X10 (3) UL (ref 1.5–7.7)
NEUTROPHILS # BLD AUTO: 7.1 X10(3) UL (ref 1.5–7.7)
NEUTROPHILS NFR BLD AUTO: 61.2 %
NONHDLC SERPL-MCNC: 81 MG/DL (ref ?–130)
OSMOLALITY SERPL CALC.SUM OF ELEC: 284 MOSM/KG (ref 275–295)
PATIENT FASTING Y/N/NP: YES
PATIENT FASTING Y/N/NP: YES
PLATELET # BLD AUTO: 189 10(3)UL (ref 150–450)
POTASSIUM SERPL-SCNC: 3.9 MMOL/L (ref 3.5–5.1)
RBC # BLD AUTO: 4.36 X10(6)UL (ref 4.3–5.7)
SODIUM SERPL-SCNC: 136 MMOL/L (ref 136–145)
TRIGL SERPL-MCNC: 75 MG/DL (ref 30–149)
TSI SER-ACNC: 1.35 MIU/ML (ref 0.36–3.74)
VLDLC SERPL CALC-MCNC: 15 MG/DL (ref 0–30)
WBC # BLD AUTO: 11.6 X10(3) UL (ref 4–11)

## 2020-02-12 PROCEDURE — 80061 LIPID PANEL: CPT

## 2020-02-12 PROCEDURE — 36415 COLL VENOUS BLD VENIPUNCTURE: CPT

## 2020-02-12 PROCEDURE — 85025 COMPLETE CBC W/AUTO DIFF WBC: CPT

## 2020-02-12 PROCEDURE — 84443 ASSAY THYROID STIM HORMONE: CPT

## 2020-02-12 PROCEDURE — 80053 COMPREHEN METABOLIC PANEL: CPT

## 2020-02-13 ENCOUNTER — OFFICE VISIT (OUTPATIENT)
Dept: INTERNAL MEDICINE CLINIC | Facility: CLINIC | Age: 35
End: 2020-02-13
Payer: COMMERCIAL

## 2020-02-13 VITALS
WEIGHT: 259 LBS | TEMPERATURE: 99 F | BODY MASS INDEX: 33.24 KG/M2 | HEART RATE: 65 BPM | SYSTOLIC BLOOD PRESSURE: 122 MMHG | OXYGEN SATURATION: 99 % | DIASTOLIC BLOOD PRESSURE: 78 MMHG | HEIGHT: 74 IN

## 2020-02-13 DIAGNOSIS — I10 BENIGN HYPERTENSION: ICD-10-CM

## 2020-02-13 DIAGNOSIS — Z00.00 PHYSICAL EXAM, ANNUAL: Primary | ICD-10-CM

## 2020-02-13 PROCEDURE — 99395 PREV VISIT EST AGE 18-39: CPT | Performed by: INTERNAL MEDICINE

## 2020-02-13 RX ORDER — LISINOPRIL AND HYDROCHLOROTHIAZIDE 25; 20 MG/1; MG/1
1 TABLET ORAL DAILY
Qty: 90 TABLET | Refills: 3 | Status: SHIPPED | OUTPATIENT
Start: 2020-02-13 | End: 2021-02-15

## 2020-02-13 RX ORDER — LISINOPRIL AND HYDROCHLOROTHIAZIDE 25; 20 MG/1; MG/1
1 TABLET ORAL DAILY
Qty: 30 TABLET | Refills: 11 | Status: SHIPPED | OUTPATIENT
Start: 2020-02-13 | End: 2021-02-15

## 2020-02-13 NOTE — PROGRESS NOTES
Ivis Mckay is a [address] old male.     HPI:   Patient presents with:  Medication Request      [address] y/o M here for physical exam; he has HTN for which he takes lisinopril/ HCTZ 20/25 mg po qD;  no CP; no SOB; no headaches; no palpitation        HISTORY: polyphagia  Gastrointestinal:  Negative for abdominal pain, constipation, decreased appetite, diarrhea and vomiting; no melena or hematochezia  Musculoskeletal:  Negative for arthralgias or myalgias  Genitourinary:  Negative for dysuria or polyuria  Hema/L not have hypermobile joints and does not meet criteria at this time; also had family members taller than 6' 5\" (Marfan's?)        RTC 1 year  Cell [phone]  Results to My Chart            Orders This Visit:  No orders of the defined types were placed

## 2020-03-19 RX ORDER — LISINOPRIL AND HYDROCHLOROTHIAZIDE 25; 20 MG/1; MG/1
1 TABLET ORAL DAILY
Qty: 30 TABLET | Refills: 0 | OUTPATIENT
Start: 2020-03-19

## 2020-11-02 ENCOUNTER — PATIENT MESSAGE (OUTPATIENT)
Dept: INTERNAL MEDICINE CLINIC | Facility: CLINIC | Age: 35
End: 2020-11-02

## 2020-11-03 NOTE — TELEPHONE ENCOUNTER
From: Kaley Hernandez  To: Richy King MD  Sent: 11/2/2020 10:34 AM CST  Subject: Referral Request    i'm looking into getting a vasectomy and am wondering if there's anyone you recommend for that procedure. thanks in advance for your help!

## 2021-02-15 ENCOUNTER — OFFICE VISIT (OUTPATIENT)
Dept: INTERNAL MEDICINE CLINIC | Facility: CLINIC | Age: 36
End: 2021-02-15
Payer: COMMERCIAL

## 2021-02-15 VITALS
WEIGHT: 264.19 LBS | HEIGHT: 73.5 IN | OXYGEN SATURATION: 99 % | BODY MASS INDEX: 34.27 KG/M2 | DIASTOLIC BLOOD PRESSURE: 80 MMHG | SYSTOLIC BLOOD PRESSURE: 110 MMHG | HEART RATE: 66 BPM | TEMPERATURE: 98 F

## 2021-02-15 DIAGNOSIS — R19.7 DIARRHEA, UNSPECIFIED TYPE: ICD-10-CM

## 2021-02-15 DIAGNOSIS — R10.11 RUQ ABDOMINAL PAIN: ICD-10-CM

## 2021-02-15 DIAGNOSIS — Z82.49 FAMILY HISTORY OF BRAIN ANEURYSM: ICD-10-CM

## 2021-02-15 DIAGNOSIS — Z00.00 PHYSICAL EXAM, ANNUAL: Primary | ICD-10-CM

## 2021-02-15 DIAGNOSIS — I10 BENIGN HYPERTENSION: ICD-10-CM

## 2021-02-15 PROCEDURE — 3079F DIAST BP 80-89 MM HG: CPT | Performed by: INTERNAL MEDICINE

## 2021-02-15 PROCEDURE — 3008F BODY MASS INDEX DOCD: CPT | Performed by: INTERNAL MEDICINE

## 2021-02-15 PROCEDURE — 99395 PREV VISIT EST AGE 18-39: CPT | Performed by: INTERNAL MEDICINE

## 2021-02-15 PROCEDURE — 3074F SYST BP LT 130 MM HG: CPT | Performed by: INTERNAL MEDICINE

## 2021-02-15 RX ORDER — LISINOPRIL AND HYDROCHLOROTHIAZIDE 25; 20 MG/1; MG/1
1 TABLET ORAL DAILY
Qty: 90 TABLET | Refills: 3 | Status: SHIPPED | OUTPATIENT
Start: 2021-02-15 | End: 2021-07-08

## 2021-02-15 NOTE — PROGRESS NOTES
Harshil Smith is a 28year old male.     HPI:   Patient presents with:  Physical  Medication Request: pending      27 y/o M here for physical exam; on lisinopril/ HCTZ 20/25 mg po every day;  no CP; no SOB; no headaches; no palpitation; c/o RUQ abdominal 20-25 MG Oral Tab Take 1 tablet by mouth daily.  90 tablet 3       Allergies:    Penicillins             NAUSEA AND VOMITING            ROS:   Constitutional: no weight loss; no fatigue  ENMT:  Negative for ear drainage, hearing loss and nasal drainage  Eye po qD     RUQ abdominal pain  1 month h/o pain occurs one hour after eating dinner; improves with walking; radiates to right flank; described as stabbing; lasts 1-2 hours; occurring daily; he stopped eating red meat ne month ago due to facial flushing when

## 2021-03-05 ENCOUNTER — LAB ENCOUNTER (OUTPATIENT)
Dept: LAB | Facility: HOSPITAL | Age: 36
End: 2021-03-05
Attending: INTERNAL MEDICINE
Payer: COMMERCIAL

## 2021-03-05 ENCOUNTER — HOSPITAL ENCOUNTER (OUTPATIENT)
Dept: CT IMAGING | Facility: HOSPITAL | Age: 36
Discharge: HOME | End: 2021-03-05
Attending: INTERNAL MEDICINE
Payer: COMMERCIAL

## 2021-03-05 ENCOUNTER — HOSPITAL ENCOUNTER (OUTPATIENT)
Dept: ULTRASOUND IMAGING | Facility: HOSPITAL | Age: 36
Discharge: HOME | End: 2021-03-05
Attending: INTERNAL MEDICINE
Payer: COMMERCIAL

## 2021-03-05 DIAGNOSIS — I10 BENIGN HYPERTENSION: ICD-10-CM

## 2021-03-05 DIAGNOSIS — Z00.00 PHYSICAL EXAM, ANNUAL: ICD-10-CM

## 2021-03-05 DIAGNOSIS — R10.11 RUQ ABDOMINAL PAIN: ICD-10-CM

## 2021-03-05 DIAGNOSIS — R19.7 DIARRHEA, UNSPECIFIED TYPE: ICD-10-CM

## 2021-03-05 DIAGNOSIS — Z82.49 FAMILY HISTORY OF BRAIN ANEURYSM: ICD-10-CM

## 2021-03-05 LAB
ALBUMIN SERPL-MCNC: 4.3 G/DL (ref 3.4–5)
ALBUMIN/GLOB SERPL: 1.2 {RATIO} (ref 1–2)
ALP LIVER SERPL-CCNC: 105 U/L
ALT SERPL-CCNC: 26 U/L
ANION GAP SERPL CALC-SCNC: 4 MMOL/L (ref 0–18)
AST SERPL-CCNC: 19 U/L (ref 15–37)
BASOPHILS # BLD AUTO: 0.03 X10(3) UL (ref 0–0.2)
BASOPHILS NFR BLD AUTO: 0.4 %
BILIRUB SERPL-MCNC: 0.9 MG/DL (ref 0.1–2)
BILIRUB UR QL: NEGATIVE
BUN BLD-MCNC: 23 MG/DL (ref 7–18)
BUN/CREAT SERPL: 24.2 (ref 10–20)
CALCIUM BLD-MCNC: 9.6 MG/DL (ref 8.5–10.1)
CHLORIDE SERPL-SCNC: 104 MMOL/L (ref 98–112)
CHOLEST SMN-MCNC: 133 MG/DL (ref ?–200)
CLARITY UR: CLEAR
CO2 SERPL-SCNC: 29 MMOL/L (ref 21–32)
COLOR UR: YELLOW
CREAT BLD-MCNC: 0.8 MG/DL
CREAT BLD-MCNC: 0.95 MG/DL
DEPRECATED RDW RBC AUTO: 46.6 FL (ref 35.1–46.3)
EOSINOPHIL # BLD AUTO: 0.33 X10(3) UL (ref 0–0.7)
EOSINOPHIL NFR BLD AUTO: 4.5 %
ERYTHROCYTE [DISTWIDTH] IN BLOOD BY AUTOMATED COUNT: 13.1 % (ref 11–15)
GLOBULIN PLAS-MCNC: 3.6 G/DL (ref 2.8–4.4)
GLUCOSE BLD-MCNC: 98 MG/DL (ref 70–99)
GLUCOSE UR-MCNC: NEGATIVE MG/DL
HCT VFR BLD AUTO: 42.4 %
HDLC SERPL-MCNC: 53 MG/DL (ref 40–59)
HGB BLD-MCNC: 14.4 G/DL
HGB UR QL STRIP.AUTO: NEGATIVE
IGA SERPL-MCNC: 139 MG/DL (ref 70–312)
IMM GRANULOCYTES # BLD AUTO: 0.03 X10(3) UL (ref 0–1)
IMM GRANULOCYTES NFR BLD: 0.4 %
KETONES UR-MCNC: NEGATIVE MG/DL
LDLC SERPL CALC-MCNC: 71 MG/DL (ref ?–100)
LEUKOCYTE ESTERASE UR QL STRIP.AUTO: NEGATIVE
LYMPHOCYTES # BLD AUTO: 1.83 X10(3) UL (ref 1–4)
LYMPHOCYTES NFR BLD AUTO: 25 %
M PROTEIN MFR SERPL ELPH: 7.9 G/DL (ref 6.4–8.2)
MCH RBC QN AUTO: 33.5 PG (ref 26–34)
MCHC RBC AUTO-ENTMCNC: 34 G/DL (ref 31–37)
MCV RBC AUTO: 98.6 FL
MONOCYTES # BLD AUTO: 0.69 X10(3) UL (ref 0.1–1)
MONOCYTES NFR BLD AUTO: 9.4 %
NEUTROPHILS # BLD AUTO: 4.42 X10 (3) UL (ref 1.5–7.7)
NEUTROPHILS # BLD AUTO: 4.42 X10(3) UL (ref 1.5–7.7)
NEUTROPHILS NFR BLD AUTO: 60.3 %
NITRITE UR QL STRIP.AUTO: NEGATIVE
NONHDLC SERPL-MCNC: 80 MG/DL (ref ?–130)
OSMOLALITY SERPL CALC.SUM OF ELEC: 288 MOSM/KG (ref 275–295)
PATIENT FASTING Y/N/NP: YES
PATIENT FASTING Y/N/NP: YES
PH UR: 6 [PH] (ref 5–8)
PLATELET # BLD AUTO: 190 10(3)UL (ref 150–450)
POTASSIUM SERPL-SCNC: 4.7 MMOL/L (ref 3.5–5.1)
PROT UR-MCNC: NEGATIVE MG/DL
RBC # BLD AUTO: 4.3 X10(6)UL
SODIUM SERPL-SCNC: 137 MMOL/L (ref 136–145)
SP GR UR STRIP: >1.03 (ref 1–1.03)
TRIGL SERPL-MCNC: 46 MG/DL (ref 30–149)
TSI SER-ACNC: 1.62 MIU/ML (ref 0.36–3.74)
UROBILINOGEN UR STRIP-ACNC: <2
VLDLC SERPL CALC-MCNC: 9 MG/DL (ref 0–30)
WBC # BLD AUTO: 7.3 X10(3) UL (ref 4–11)

## 2021-03-05 PROCEDURE — 81003 URINALYSIS AUTO W/O SCOPE: CPT

## 2021-03-05 PROCEDURE — 82565 ASSAY OF CREATININE: CPT

## 2021-03-05 PROCEDURE — 70496 CT ANGIOGRAPHY HEAD: CPT | Performed by: INTERNAL MEDICINE

## 2021-03-05 PROCEDURE — 85025 COMPLETE CBC W/AUTO DIFF WBC: CPT

## 2021-03-05 PROCEDURE — 76705 ECHO EXAM OF ABDOMEN: CPT | Performed by: INTERNAL MEDICINE

## 2021-03-05 PROCEDURE — 80053 COMPREHEN METABOLIC PANEL: CPT

## 2021-03-05 PROCEDURE — 83516 IMMUNOASSAY NONANTIBODY: CPT

## 2021-03-05 PROCEDURE — 84443 ASSAY THYROID STIM HORMONE: CPT

## 2021-03-05 PROCEDURE — 80061 LIPID PANEL: CPT

## 2021-03-05 PROCEDURE — 36415 COLL VENOUS BLD VENIPUNCTURE: CPT

## 2021-03-05 PROCEDURE — 82784 ASSAY IGA/IGD/IGG/IGM EACH: CPT

## 2021-03-09 ENCOUNTER — TELEPHONE (OUTPATIENT)
Dept: INTERNAL MEDICINE CLINIC | Facility: CLINIC | Age: 36
End: 2021-03-09

## 2021-03-09 DIAGNOSIS — R10.11 RUQ ABDOMINAL PAIN: Primary | ICD-10-CM

## 2021-03-09 LAB — TTG IGA SER-ACNC: 0.3 U/ML (ref ?–7)

## 2021-03-09 NOTE — TELEPHONE ENCOUNTER
Pt asked that Dr Placido Cary please call him to discuss results of tests taken last week  Tasked to nursing

## 2021-03-11 NOTE — TELEPHONE ENCOUNTER
Labs OK  RUQ ultrasound with +heaptic steatosis  CTA brain neg aneurysm    Imp- post-prandial RUQ pain    Rec- HIDA scan    Pt called

## 2021-03-15 ENCOUNTER — OFFICE VISIT (OUTPATIENT)
Dept: SURGERY | Facility: CLINIC | Age: 36
End: 2021-03-15
Payer: COMMERCIAL

## 2021-03-15 VITALS
HEART RATE: 76 BPM | BODY MASS INDEX: 33.37 KG/M2 | HEIGHT: 74 IN | SYSTOLIC BLOOD PRESSURE: 113 MMHG | WEIGHT: 260 LBS | DIASTOLIC BLOOD PRESSURE: 76 MMHG

## 2021-03-15 DIAGNOSIS — Z30.09 VASECTOMY EVALUATION: Primary | ICD-10-CM

## 2021-03-15 PROCEDURE — 3008F BODY MASS INDEX DOCD: CPT | Performed by: UROLOGY

## 2021-03-15 PROCEDURE — 3078F DIAST BP <80 MM HG: CPT | Performed by: UROLOGY

## 2021-03-15 PROCEDURE — 3074F SYST BP LT 130 MM HG: CPT | Performed by: UROLOGY

## 2021-03-15 PROCEDURE — 99244 OFF/OP CNSLTJ NEW/EST MOD 40: CPT | Performed by: UROLOGY

## 2021-03-15 NOTE — PROGRESS NOTES
[facility]  Initial Office Consultation    HPI:   Reanna Salmeron is a 28year old male here today for consultation at the request of, and a copy of this note will be sent to, Ivone Sylvester MD.    Patient presents today in consultation fo not present. Left: Mass, tenderness or swelling not present. Epididymis:      Right: Normal.      Left: Normal.      Comments: Thick scrotal skin with a good amount of subcutaneous fat. Thick spermatic cords bilaterally.   Normal spermatic cor with in vitro fertilization. These options are not always successful, and they may be expensive. · The rates of surgical complications such as symptomatic hematoma and infection are 1-2%.     · Chronic scrotal pain associated with negative impact on qual

## 2021-03-17 ENCOUNTER — TELEPHONE (OUTPATIENT)
Dept: SURGERY | Facility: CLINIC | Age: 36
End: 2021-03-17

## 2021-05-01 DIAGNOSIS — I10 BENIGN HYPERTENSION: ICD-10-CM

## 2021-05-01 DIAGNOSIS — Z00.00 PHYSICAL EXAM, ANNUAL: ICD-10-CM

## 2021-05-03 RX ORDER — LISINOPRIL AND HYDROCHLOROTHIAZIDE 25; 20 MG/1; MG/1
1 TABLET ORAL DAILY
Qty: 30 TABLET | Refills: 0 | OUTPATIENT
Start: 2021-05-03

## 2021-05-03 NOTE — TELEPHONE ENCOUNTER
Current refill request refused due to refill is either a duplicate request or has active refills at the pharmacy. Check previous templates.     Requested Prescriptions     Refused Prescriptions Disp Refills   • LISINOPRIL-HYDROCHLOROTHIAZIDE 20-25 MG Oral

## 2021-07-08 DIAGNOSIS — Z00.00 PHYSICAL EXAM, ANNUAL: ICD-10-CM

## 2021-07-08 DIAGNOSIS — I10 BENIGN HYPERTENSION: ICD-10-CM

## 2021-07-08 RX ORDER — AMLODIPINE BESYLATE 5 MG/1
5 TABLET ORAL DAILY
Qty: 30 TABLET | Refills: 5 | Status: SHIPPED | OUTPATIENT
Start: 2021-07-08 | End: 2021-08-09

## 2021-07-08 NOTE — TELEPHONE ENCOUNTER
Pt requesting change in blood pressure medication:  Lisinopril/hydrochlorothiazide  Pt is having stomach issues and believes it is related, pt stopped medication two days ago and stomach issues are going away    Can an alternative medication be prescribed?

## 2021-07-08 NOTE — TELEPHONE ENCOUNTER
Imp- HTN    Stop lisinopril/ hydrochlorothiazide 20/25 mg daily due to side effects of RUQ pain    rec- start amlodipine 5 mg daily    ERx sent; pt called

## 2021-07-08 NOTE — TELEPHONE ENCOUNTER
Spoke with patient who reports symptoms started around [place]/New Years. He was seen in February and had [address] for these symptoms. He was waiting to see if symptoms would improve but they have not.  He reports it feels like his gallbladder is \"being stabbe

## 2021-08-09 RX ORDER — LISINOPRIL AND HYDROCHLOROTHIAZIDE 25; 20 MG/1; MG/1
1 TABLET ORAL DAILY
Qty: 90 TABLET | Refills: 3 | Status: SHIPPED | OUTPATIENT
Start: 2021-08-09

## 2021-08-09 NOTE — TELEPHONE ENCOUNTER
To Dr. Jose Mijares to advise-----    Per 7/8 TE, pt was switched to amlodipine from lisinopril hydrochlorothiazide due to stomach problems. Spoke to pt, reports he did not feel right on the amlodipine and had a lot of swelling in legs and hands.  He stopped this medi

## 2021-08-09 NOTE — TELEPHONE ENCOUNTER
After several days of taking amlodipine, pt felt terrible and stopped taking med     Went back to  lisinopril/hydrochlorothiazide & has one pill left for tomorrow    Requests refill at the [place] [phone] on  [address] in [facility]    Pt can be reached a

## 2021-11-17 ENCOUNTER — TELEPHONE (OUTPATIENT)
Dept: INTERNAL MEDICINE CLINIC | Facility: CLINIC | Age: 36
End: 2021-11-17

## 2021-11-17 ENCOUNTER — HOSPITAL ENCOUNTER (EMERGENCY)
Facility: HOSPITAL | Age: 36
Discharge: HOME | End: 2021-11-17
Attending: EMERGENCY MEDICINE
Payer: COMMERCIAL

## 2021-11-17 VITALS
TEMPERATURE: 98 F | HEIGHT: 74 IN | RESPIRATION RATE: 21 BRPM | SYSTOLIC BLOOD PRESSURE: 124 MMHG | OXYGEN SATURATION: 95 % | BODY MASS INDEX: 34.01 KG/M2 | HEART RATE: 86 BPM | WEIGHT: 265 LBS | DIASTOLIC BLOOD PRESSURE: 81 MMHG

## 2021-11-17 DIAGNOSIS — R11.2 INTRACTABLE VOMITING WITH NAUSEA, UNSPECIFIED VOMITING TYPE: ICD-10-CM

## 2021-11-17 DIAGNOSIS — U07.1 COVID-19 VIRUS INFECTION: Primary | ICD-10-CM

## 2021-11-17 PROCEDURE — 85025 COMPLETE CBC W/AUTO DIFF WBC: CPT

## 2021-11-17 PROCEDURE — 96374 THER/PROPH/DIAG INJ IV PUSH: CPT

## 2021-11-17 PROCEDURE — 80048 BASIC METABOLIC PNL TOTAL CA: CPT | Performed by: EMERGENCY MEDICINE

## 2021-11-17 PROCEDURE — 80048 BASIC METABOLIC PNL TOTAL CA: CPT

## 2021-11-17 PROCEDURE — 85025 COMPLETE CBC W/AUTO DIFF WBC: CPT | Performed by: EMERGENCY MEDICINE

## 2021-11-17 PROCEDURE — 96361 HYDRATE IV INFUSION ADD-ON: CPT

## 2021-11-17 PROCEDURE — 99284 EMERGENCY DEPT VISIT MOD MDM: CPT

## 2021-11-17 RX ORDER — ONDANSETRON 2 MG/ML
4 INJECTION INTRAMUSCULAR; INTRAVENOUS ONCE
Status: COMPLETED | OUTPATIENT
Start: 2021-11-17 | End: 2021-11-17

## 2021-11-17 RX ORDER — ONDANSETRON 4 MG/1
4 TABLET, ORALLY DISINTEGRATING ORAL EVERY 4 HOURS PRN
Qty: 10 TABLET | Refills: 0 | Status: SHIPPED | OUTPATIENT
Start: 2021-11-17 | End: 2021-11-24

## 2021-11-17 NOTE — ED PROVIDER NOTES
Patient Seen in: [facility] AND [facility] Emergency Department      History   Patient presents with:  Covid  Nausea/vomiting    Stated Complaint: covid    Subjective:   HPI    The patient is a 15-year-old male with a history of hypertension who has had mild cou not ill-appearing. HENT:      Head: Normocephalic and atraumatic.       Nose: Nose normal.      Mouth/Throat:      Mouth: Mucous membranes are moist.   Eyes:      Conjunctiva/sclera: Conjunctivae normal.      Pupils: Pupils are equal, round, and reactive ------                     CBC W/ DIFFERENTIAL[[id number]]          Abnormal            Final result                 Please view results for these tests on the individual orders.           Patient feels better after IV fluids and Zofran, vital signs

## 2021-11-17 NOTE — TELEPHONE ENCOUNTER
Pt received PAB infusion at [address] [place] [address] on 11/17 for COVID-19. Please follow-up with pt for post-infusion assessment and home monitoring if needed. Thank you.

## 2022-06-13 ENCOUNTER — TELEPHONE (OUTPATIENT)
Dept: SURGERY | Facility: CLINIC | Age: 37
End: 2022-06-13

## 2022-06-13 NOTE — TELEPHONE ENCOUNTER
L/VM asking to confirm either office/hospital     Pt calling to schedule vasectomy aware nothing available until October maybe later please advise             Electronically signed by Nabila Stewart at 6/13/2022 10:18 AM

## 2022-08-31 ENCOUNTER — PATIENT MESSAGE (OUTPATIENT)
Dept: INTERNAL MEDICINE CLINIC | Facility: CLINIC | Age: 37
End: 2022-08-31

## 2022-08-31 ENCOUNTER — OFFICE VISIT (OUTPATIENT)
Dept: INTERNAL MEDICINE CLINIC | Facility: CLINIC | Age: 37
End: 2022-08-31
Payer: COMMERCIAL

## 2022-08-31 VITALS
SYSTOLIC BLOOD PRESSURE: 122 MMHG | HEIGHT: 74 IN | TEMPERATURE: 98 F | HEART RATE: 74 BPM | WEIGHT: 279.63 LBS | OXYGEN SATURATION: 94 % | BODY MASS INDEX: 35.89 KG/M2 | DIASTOLIC BLOOD PRESSURE: 78 MMHG

## 2022-08-31 DIAGNOSIS — I10 BENIGN HYPERTENSION: ICD-10-CM

## 2022-08-31 DIAGNOSIS — R10.11 RUQ ABDOMINAL PAIN: Primary | ICD-10-CM

## 2022-08-31 DIAGNOSIS — K21.9 GASTROESOPHAGEAL REFLUX DISEASE, UNSPECIFIED WHETHER ESOPHAGITIS PRESENT: ICD-10-CM

## 2022-08-31 PROCEDURE — 3074F SYST BP LT 130 MM HG: CPT | Performed by: INTERNAL MEDICINE

## 2022-08-31 PROCEDURE — 99214 OFFICE O/P EST MOD 30 MIN: CPT | Performed by: INTERNAL MEDICINE

## 2022-08-31 PROCEDURE — 3078F DIAST BP <80 MM HG: CPT | Performed by: INTERNAL MEDICINE

## 2022-08-31 PROCEDURE — 3008F BODY MASS INDEX DOCD: CPT | Performed by: INTERNAL MEDICINE

## 2022-08-31 RX ORDER — HYDROCHLOROTHIAZIDE 25 MG/1
25 TABLET ORAL DAILY
Qty: 90 TABLET | Refills: 3 | Status: SHIPPED | OUTPATIENT
Start: 2022-08-31

## 2022-08-31 RX ORDER — POTASSIUM CHLORIDE 750 MG/1
10 TABLET, FILM COATED, EXTENDED RELEASE ORAL DAILY
Qty: 90 TABLET | Refills: 3 | Status: SHIPPED | OUTPATIENT
Start: 2022-08-31

## 2022-08-31 RX ORDER — PANTOPRAZOLE SODIUM 40 MG/1
40 TABLET, DELAYED RELEASE ORAL
Qty: 90 TABLET | Refills: 3 | Status: SHIPPED | OUTPATIENT
Start: 2022-08-31

## 2022-09-01 ENCOUNTER — TELEPHONE (OUTPATIENT)
Dept: INTERNAL MEDICINE CLINIC | Facility: CLINIC | Age: 37
End: 2022-09-01

## 2022-09-21 ENCOUNTER — TELEPHONE (OUTPATIENT)
Dept: INTERNAL MEDICINE CLINIC | Facility: CLINIC | Age: 37
End: 2022-09-21

## 2022-09-21 NOTE — TELEPHONE ENCOUNTER
Please call patient  He is hoping to be seen by Dr Va Valero this week, no openings  Pt not feeling good, BP is normal but patient feels like he is having a reaction to the medication:  Lisinopril Hydrochlorothiazie  Patient feels off, fingers tingly, pins and needles in head, throat feels swollen  Please call to discuss/advise  Tasked to nursing

## 2022-09-21 NOTE — TELEPHONE ENCOUNTER
Please advise - called patient who was recently switched to Hydrochlorothiazide - for 2 weeks he started feeling tingling in hands, arms and head  And throat feels tight , denies SOB . Last Wednesday he switched back to Lisinopril/ Hydrochlorothiazide 20/25 but the symptoms are still present - to DR. FAIRBANKS

## 2022-09-22 ENCOUNTER — LAB ENCOUNTER (OUTPATIENT)
Dept: LAB | Facility: HOSPITAL | Age: 37
End: 2022-09-22
Attending: INTERNAL MEDICINE

## 2022-09-22 DIAGNOSIS — I10 BENIGN HYPERTENSION: ICD-10-CM

## 2022-09-22 LAB
ALBUMIN SERPL-MCNC: 4.5 G/DL (ref 3.4–5)
ALBUMIN/GLOB SERPL: 1.3 {RATIO} (ref 1–2)
ALP LIVER SERPL-CCNC: 95 U/L
ALT SERPL-CCNC: 26 U/L
ANION GAP SERPL CALC-SCNC: 7 MMOL/L (ref 0–18)
AST SERPL-CCNC: 14 U/L (ref 15–37)
BASOPHILS # BLD AUTO: 0.03 X10(3) UL (ref 0–0.2)
BASOPHILS NFR BLD AUTO: 0.3 %
BILIRUB SERPL-MCNC: 1.1 MG/DL (ref 0.1–2)
BUN BLD-MCNC: 20 MG/DL (ref 7–18)
BUN/CREAT SERPL: 20 (ref 10–20)
CALCIUM BLD-MCNC: 9.6 MG/DL (ref 8.5–10.1)
CHLORIDE SERPL-SCNC: 102 MMOL/L (ref 98–112)
CHOLEST SERPL-MCNC: 148 MG/DL (ref ?–200)
CO2 SERPL-SCNC: 27 MMOL/L (ref 21–32)
CREAT BLD-MCNC: 1 MG/DL
DEPRECATED RDW RBC AUTO: 47.4 FL (ref 35.1–46.3)
EOSINOPHIL # BLD AUTO: 0.43 X10(3) UL (ref 0–0.7)
EOSINOPHIL NFR BLD AUTO: 4.9 %
ERYTHROCYTE [DISTWIDTH] IN BLOOD BY AUTOMATED COUNT: 12.9 % (ref 11–15)
FASTING PATIENT LIPID ANSWER: YES
FASTING STATUS PATIENT QL REPORTED: YES
GFR SERPLBLD BASED ON 1.73 SQ M-ARVRAT: 99 ML/MIN/1.73M2 (ref 60–?)
GLOBULIN PLAS-MCNC: 3.6 G/DL (ref 2.8–4.4)
GLUCOSE BLD-MCNC: 98 MG/DL (ref 70–99)
HCT VFR BLD AUTO: 43.6 %
HDLC SERPL-MCNC: 47 MG/DL (ref 40–59)
HGB BLD-MCNC: 14.8 G/DL
IMM GRANULOCYTES # BLD AUTO: 0.03 X10(3) UL (ref 0–1)
IMM GRANULOCYTES NFR BLD: 0.3 %
LDLC SERPL CALC-MCNC: 86 MG/DL (ref ?–100)
LYMPHOCYTES # BLD AUTO: 1.9 X10(3) UL (ref 1–4)
LYMPHOCYTES NFR BLD AUTO: 21.8 %
MCH RBC QN AUTO: 33.7 PG (ref 26–34)
MCHC RBC AUTO-ENTMCNC: 33.9 G/DL (ref 31–37)
MCV RBC AUTO: 99.3 FL
MONOCYTES # BLD AUTO: 0.66 X10(3) UL (ref 0.1–1)
MONOCYTES NFR BLD AUTO: 7.6 %
NEUTROPHILS # BLD AUTO: 5.67 X10 (3) UL (ref 1.5–7.7)
NEUTROPHILS # BLD AUTO: 5.67 X10(3) UL (ref 1.5–7.7)
NEUTROPHILS NFR BLD AUTO: 65.1 %
NONHDLC SERPL-MCNC: 101 MG/DL (ref ?–130)
OSMOLALITY SERPL CALC.SUM OF ELEC: 285 MOSM/KG (ref 275–295)
PLATELET # BLD AUTO: 232 10(3)UL (ref 150–450)
POTASSIUM SERPL-SCNC: 4.2 MMOL/L (ref 3.5–5.1)
PROT SERPL-MCNC: 8.1 G/DL (ref 6.4–8.2)
RBC # BLD AUTO: 4.39 X10(6)UL
SODIUM SERPL-SCNC: 136 MMOL/L (ref 136–145)
TRIGL SERPL-MCNC: 75 MG/DL (ref 30–149)
TSI SER-ACNC: 1.7 MIU/ML (ref 0.36–3.74)
VLDLC SERPL CALC-MCNC: 12 MG/DL (ref 0–30)
WBC # BLD AUTO: 8.7 X10(3) UL (ref 4–11)

## 2022-09-22 PROCEDURE — 84443 ASSAY THYROID STIM HORMONE: CPT

## 2022-09-22 PROCEDURE — 80061 LIPID PANEL: CPT

## 2022-09-22 PROCEDURE — 85025 COMPLETE CBC W/AUTO DIFF WBC: CPT

## 2022-09-22 PROCEDURE — 80053 COMPREHEN METABOLIC PANEL: CPT

## 2022-09-22 PROCEDURE — 36415 COLL VENOUS BLD VENIPUNCTURE: CPT

## 2022-09-22 NOTE — TELEPHONE ENCOUNTER
Called pt and relayed MD message below. Pt verbalized understanding and agreeable to 11:45 appt on Friday.      To FD to please schedule pt per MD message below (RN unable to schedule)

## 2022-09-23 ENCOUNTER — APPOINTMENT (OUTPATIENT)
Dept: GENERAL RADIOLOGY | Facility: HOSPITAL | Age: 37
End: 2022-09-23
Attending: EMERGENCY MEDICINE

## 2022-09-23 ENCOUNTER — HOSPITAL ENCOUNTER (EMERGENCY)
Facility: HOSPITAL | Age: 37
Discharge: HOME | End: 2022-09-23
Attending: EMERGENCY MEDICINE

## 2022-09-23 ENCOUNTER — OFFICE VISIT (OUTPATIENT)
Dept: INTERNAL MEDICINE CLINIC | Facility: CLINIC | Age: 37
End: 2022-09-23

## 2022-09-23 VITALS
SYSTOLIC BLOOD PRESSURE: 118 MMHG | TEMPERATURE: 98 F | HEART RATE: 68 BPM | BODY MASS INDEX: 34.55 KG/M2 | HEIGHT: 74 IN | OXYGEN SATURATION: 98 % | WEIGHT: 269.19 LBS | DIASTOLIC BLOOD PRESSURE: 74 MMHG

## 2022-09-23 VITALS
HEART RATE: 76 BPM | BODY MASS INDEX: 33.37 KG/M2 | WEIGHT: 260 LBS | RESPIRATION RATE: 18 BRPM | DIASTOLIC BLOOD PRESSURE: 78 MMHG | OXYGEN SATURATION: 98 % | TEMPERATURE: 99 F | HEIGHT: 74 IN | SYSTOLIC BLOOD PRESSURE: 114 MMHG

## 2022-09-23 DIAGNOSIS — M75.22 BICEPS TENDINITIS OF LEFT UPPER EXTREMITY: ICD-10-CM

## 2022-09-23 DIAGNOSIS — I10 BENIGN HYPERTENSION: ICD-10-CM

## 2022-09-23 DIAGNOSIS — K21.9 GASTROESOPHAGEAL REFLUX DISEASE, UNSPECIFIED WHETHER ESOPHAGITIS PRESENT: ICD-10-CM

## 2022-09-23 DIAGNOSIS — R20.2 PARESTHESIAS: ICD-10-CM

## 2022-09-23 DIAGNOSIS — R07.9 ACUTE CHEST PAIN: Primary | ICD-10-CM

## 2022-09-23 DIAGNOSIS — R10.11 RUQ ABDOMINAL PAIN: ICD-10-CM

## 2022-09-23 DIAGNOSIS — S46.812A STRAIN OF LEFT TRAPEZIUS MUSCLE, INITIAL ENCOUNTER: Primary | ICD-10-CM

## 2022-09-23 LAB
ANION GAP SERPL CALC-SCNC: 4 MMOL/L (ref 0–18)
BASOPHILS # BLD AUTO: 0.02 X10(3) UL (ref 0–0.2)
BASOPHILS NFR BLD AUTO: 0.2 %
BUN BLD-MCNC: 22 MG/DL (ref 7–18)
BUN/CREAT SERPL: 21.4 (ref 10–20)
CALCIUM BLD-MCNC: 9.4 MG/DL (ref 8.5–10.1)
CHLORIDE SERPL-SCNC: 103 MMOL/L (ref 98–112)
CO2 SERPL-SCNC: 29 MMOL/L (ref 21–32)
CREAT BLD-MCNC: 1.03 MG/DL
DEPRECATED RDW RBC AUTO: 47.1 FL (ref 35.1–46.3)
EOSINOPHIL # BLD AUTO: 0.26 X10(3) UL (ref 0–0.7)
EOSINOPHIL NFR BLD AUTO: 2.9 %
ERYTHROCYTE [DISTWIDTH] IN BLOOD BY AUTOMATED COUNT: 12.8 % (ref 11–15)
GFR SERPLBLD BASED ON 1.73 SQ M-ARVRAT: 96 ML/MIN/1.73M2 (ref 60–?)
GLUCOSE BLD-MCNC: 111 MG/DL (ref 70–99)
HCT VFR BLD AUTO: 42.6 %
HGB BLD-MCNC: 14.7 G/DL
IMM GRANULOCYTES # BLD AUTO: 0.03 X10(3) UL (ref 0–1)
IMM GRANULOCYTES NFR BLD: 0.3 %
LYMPHOCYTES # BLD AUTO: 2.04 X10(3) UL (ref 1–4)
LYMPHOCYTES NFR BLD AUTO: 22.9 %
MCH RBC QN AUTO: 34.4 PG (ref 26–34)
MCHC RBC AUTO-ENTMCNC: 34.5 G/DL (ref 31–37)
MCV RBC AUTO: 99.8 FL
MONOCYTES # BLD AUTO: 0.66 X10(3) UL (ref 0.1–1)
MONOCYTES NFR BLD AUTO: 7.4 %
NEUTROPHILS # BLD AUTO: 5.9 X10 (3) UL (ref 1.5–7.7)
NEUTROPHILS # BLD AUTO: 5.9 X10(3) UL (ref 1.5–7.7)
NEUTROPHILS NFR BLD AUTO: 66.3 %
OSMOLALITY SERPL CALC.SUM OF ELEC: 286 MOSM/KG (ref 275–295)
PLATELET # BLD AUTO: 187 10(3)UL (ref 150–450)
POTASSIUM SERPL-SCNC: 4.1 MMOL/L (ref 3.5–5.1)
RBC # BLD AUTO: 4.27 X10(6)UL
SODIUM SERPL-SCNC: 136 MMOL/L (ref 136–145)
TROPONIN I HIGH SENSITIVITY: 4 NG/L
TROPONIN I HIGH SENSITIVITY: 6 NG/L
WBC # BLD AUTO: 8.9 X10(3) UL (ref 4–11)

## 2022-09-23 PROCEDURE — 36415 COLL VENOUS BLD VENIPUNCTURE: CPT

## 2022-09-23 PROCEDURE — 3078F DIAST BP <80 MM HG: CPT | Performed by: INTERNAL MEDICINE

## 2022-09-23 PROCEDURE — 99284 EMERGENCY DEPT VISIT MOD MDM: CPT

## 2022-09-23 PROCEDURE — 93005 ELECTROCARDIOGRAM TRACING: CPT

## 2022-09-23 PROCEDURE — 3074F SYST BP LT 130 MM HG: CPT | Performed by: INTERNAL MEDICINE

## 2022-09-23 PROCEDURE — 85025 COMPLETE CBC W/AUTO DIFF WBC: CPT | Performed by: EMERGENCY MEDICINE

## 2022-09-23 PROCEDURE — 71045 X-RAY EXAM CHEST 1 VIEW: CPT | Performed by: EMERGENCY MEDICINE

## 2022-09-23 PROCEDURE — 3008F BODY MASS INDEX DOCD: CPT | Performed by: INTERNAL MEDICINE

## 2022-09-23 PROCEDURE — 99214 OFFICE O/P EST MOD 30 MIN: CPT | Performed by: INTERNAL MEDICINE

## 2022-09-23 PROCEDURE — 93010 ELECTROCARDIOGRAM REPORT: CPT | Performed by: EMERGENCY MEDICINE

## 2022-09-23 PROCEDURE — 80048 BASIC METABOLIC PNL TOTAL CA: CPT | Performed by: EMERGENCY MEDICINE

## 2022-09-23 PROCEDURE — 84484 ASSAY OF TROPONIN QUANT: CPT | Performed by: EMERGENCY MEDICINE

## 2022-09-23 RX ORDER — TIZANIDINE 4 MG/1
4 TABLET ORAL EVERY 8 HOURS PRN
Qty: 42 TABLET | Refills: 2 | Status: SHIPPED | OUTPATIENT
Start: 2022-09-23

## 2022-09-23 RX ORDER — LISINOPRIL AND HYDROCHLOROTHIAZIDE 25; 20 MG/1; MG/1
1 TABLET ORAL DAILY
COMMUNITY
Start: 2022-09-07

## 2022-09-23 RX ORDER — DICLOFENAC SODIUM 75 MG/1
75 TABLET, DELAYED RELEASE ORAL 2 TIMES DAILY
Qty: 60 TABLET | Refills: 1 | Status: SHIPPED | OUTPATIENT
Start: 2022-09-23

## 2022-09-23 NOTE — ED INITIAL ASSESSMENT (HPI)
Patient arrives to the ER complaining of a \"erendira horse\" on the left side of his chest and left arm pain. Denies SOB. Patient also complaining of gland swelling in his throat. Patient speaking in full clear sentences, no distress noted.

## 2022-10-01 ENCOUNTER — PATIENT MESSAGE (OUTPATIENT)
Dept: INTERNAL MEDICINE CLINIC | Facility: CLINIC | Age: 37
End: 2022-10-01

## 2022-10-03 ENCOUNTER — TELEPHONE (OUTPATIENT)
Dept: INTERNAL MEDICINE CLINIC | Facility: CLINIC | Age: 37
End: 2022-10-03

## 2022-10-03 NOTE — TELEPHONE ENCOUNTER
Attempted to call patient for triage but no answer and voicemail box full so unable to leave a message. My chart message sent in original my chart encounter requesting patient call office to speak with nursing.

## 2022-10-11 NOTE — TELEPHONE ENCOUNTER
Patient has not read Taigen message sent advising him to call the office. I called to reach him on number on file. VM box full, unable to leave a message. Of note, patient was seen in-office 9/23/22; s/p ER f/u earlier that day.

## 2022-10-12 NOTE — TELEPHONE ENCOUNTER
Attempted to call patient for triage but no answer and voicemail box full so unable to leave a message.    Nursing to f/up

## 2022-10-13 NOTE — TELEPHONE ENCOUNTER
To Dr. Alfredo Lee Louder to report RUQ abdominal pain--no changes since most recent office visit. Having loose/watery BMs; about 2-3x daily; No bright red blood; No black tarry stools; orange in color. Continues to report sensation of \"throat tightness\"; reporting pressure sensation stating \"feels like a vice around my whole neck. \" Pt feels like his glands are swollen. Denies any chest pain/chest pressure/chest tightness. No SOB. No fevers/chills. Recently did H. Pylori breath test (at [facility])--negative per pt. Pt discontinued PPI (which he states was providing some symptoms relief) because he was experiencing nose bleeds ~1 hr after taking--no episodes of nose bleeds since. Appt offered; pt declined. Pt requesting referral or testing. Please advise. Patient was notified that this message will be routed to the physician to determine what their recommendation (s) would be. In the meantime, if they develop new or worsening symptoms, they were advised to call back or seek emergent evaluation at the ER.

## 2022-10-18 ENCOUNTER — TELEPHONE (OUTPATIENT)
Dept: INTERNAL MEDICINE CLINIC | Facility: CLINIC | Age: 37
End: 2022-10-18

## 2022-10-18 NOTE — TELEPHONE ENCOUNTER
Patient is calling asking to be prescribed different blood pressure medication. Having side effects from Lisinopril of dizziness, swollen throat and stomach issues. Stopped taking medication for about a week and felt better. Blood pressure numbers have been going up, so patient took one lisinopril today, feeling terrible again. [place] so bad that he stayed home from work. Asking not to be prescribed Amlodipine, had side effects with this medication.     Khang in [facility] call back number [phone]

## 2022-10-18 NOTE — TELEPHONE ENCOUNTER
To Dr. Malhotra Moselle:  Pt stopped taking lisinopril-hydroCHLOROthiazide 20-25 mg 1 week ago--RUQ abdominal pain, head cloudiness and sensation of \"throat tightness\" resolved. BP yesterday was 150/90, pt took 1 tab lisinopril-hydroCHLOROthiazide 20-25 mg yesterday evening--symptoms returned. States he \"feels like death. \" Swelling sensation in neck/throat is back; reporting brain fog/cloudiness; states he feels like there's a lot of phlegm in his throat and he clears his throat constantly. Denies trouble breathing/SOB, chest pain/chest tightness, headaches, mouth/tongue/throat swelling, wheezing, or rash. BP was 130/82 this morning. Advised pt to continue to monitor blood pressures and notify nursing if elevated. Reviewed ER precautions with pt; pt verbalized understanding. Pt requesting alternative BP med (not amlodipine--reports he had side effects). Please advise.

## 2022-10-19 RX ORDER — METOPROLOL SUCCINATE 50 MG/1
50 TABLET, EXTENDED RELEASE ORAL DAILY
Qty: 30 TABLET | Refills: 5 | Status: SHIPPED | OUTPATIENT
Start: 2022-10-19

## 2022-10-19 NOTE — TELEPHONE ENCOUNTER
HTN  -Will stop lisinopril/ HCTZ 20/25 mg po every day due to throat tightness  -does not tolerate amlodipine due to leg edema  -had abdominal pain with hydrochlorothiazide    Rec- start metoprolol ER 50 mg po daily, #30, 5 RF    Pt called

## 2022-10-21 NOTE — TELEPHONE ENCOUNTER
Patient is calling to speak with Dr Diallo Fairbanks. Patient states he is still having the same symptoms that was talked about recently. States he is still having head pain, numb hands, and more. Thinks he needs more testing.       # [phone]

## 2022-10-21 NOTE — TELEPHONE ENCOUNTER
Patient's wife, Ollie Brooks is calling to request a new primary care physician. Ollie Brooks started the conversation by saying the patient is looking for a new doctor. After further evaluation,  found out that patient is a patient of Dr Tanmay Cervantes. When questioning this, Ollie Brooks states, \"Dr Issac Alfonso does not respond to my 's concerns and I thinks its personally ridiculous. \"  Ollie Brooks was informed that Dr Issac Alfonso is in clinic with patients currently and usually sees his message at the end of his schedule. Ollie Brooks was also informed that Dr Issac Alfonso did speak with the patient on Wednesday, 10/19 per another encounter. Ollie Brooks verbalized frustration and states she/the patient would like a call back from Dr Issac Alfonso today. ABAD.     Patient can be reached at  # [phone]

## 2022-11-04 ENCOUNTER — IMAGING SERVICES (OUTPATIENT)
Dept: OTHER | Age: 37
End: 2022-11-04

## 2022-11-06 ENCOUNTER — APPOINTMENT (OUTPATIENT)
Dept: GENERAL RADIOLOGY | Age: 37
DRG: 645 | End: 2022-11-06
Attending: EMERGENCY MEDICINE

## 2022-11-06 ENCOUNTER — HOSPITAL ENCOUNTER (INPATIENT)
Age: 37
LOS: 2 days | Discharge: HOME | DRG: 645 | End: 2022-11-08
Attending: EMERGENCY MEDICINE | Admitting: INTERNAL MEDICINE

## 2022-11-06 ENCOUNTER — TELEPHONE (OUTPATIENT)
Dept: EMERGENCY MEDICINE | Age: 37
End: 2022-11-06

## 2022-11-06 ENCOUNTER — APPOINTMENT (OUTPATIENT)
Dept: CT IMAGING | Age: 37
DRG: 645 | End: 2022-11-06
Attending: EMERGENCY MEDICINE

## 2022-11-06 DIAGNOSIS — G93.5 CHIARI MALFORMATION TYPE I (CMD): ICD-10-CM

## 2022-11-06 DIAGNOSIS — R00.2 PALPITATIONS: ICD-10-CM

## 2022-11-06 DIAGNOSIS — R21 RASH: Primary | ICD-10-CM

## 2022-11-06 DIAGNOSIS — E27.8 ADRENAL INCIDENTALOMA (CMD): ICD-10-CM

## 2022-11-06 LAB
ALBUMIN SERPL-MCNC: 4.7 G/DL (ref 3.6–5.1)
ALBUMIN/GLOB SERPL: 1.4 {RATIO} (ref 1–2.4)
ALP SERPL-CCNC: 87 UNITS/L (ref 45–117)
ALT SERPL-CCNC: 23 UNITS/L
ANION GAP SERPL CALC-SCNC: 9 MMOL/L (ref 7–19)
AST SERPL-CCNC: 21 UNITS/L
BASOPHILS # BLD: 0 K/MCL (ref 0–0.3)
BASOPHILS NFR BLD: 0 %
BILIRUB SERPL-MCNC: 1.1 MG/DL (ref 0.2–1)
BUN SERPL-MCNC: 12 MG/DL (ref 6–20)
BUN/CREAT SERPL: 14 (ref 7–25)
CALCIUM SERPL-MCNC: 9.7 MG/DL (ref 8.4–10.2)
CHLORIDE SERPL-SCNC: 108 MMOL/L (ref 97–110)
CO2 SERPL-SCNC: 26 MMOL/L (ref 21–32)
CREAT SERPL-MCNC: 0.83 MG/DL (ref 0.67–1.17)
CRP SERPL-MCNC: 0.7 MG/DL
DEPRECATED RDW RBC: 47.6 FL (ref 39–50)
EOSINOPHIL # BLD: 0.1 K/MCL (ref 0–0.5)
EOSINOPHIL NFR BLD: 1 %
ERYTHROCYTE [DISTWIDTH] IN BLOOD: 13.3 % (ref 11–15)
ERYTHROCYTE [SEDIMENTATION RATE] IN BLOOD BY WESTERGREN METHOD: 13 MM/HR (ref 0–20)
FASTING DURATION TIME PATIENT: ABNORMAL H
FLUAV RNA RESP QL NAA+PROBE: NOT DETECTED
FLUBV RNA RESP QL NAA+PROBE: NOT DETECTED
GFR SERPLBLD BASED ON 1.73 SQ M-ARVRAT: >90 ML/MIN
GLOBULIN SER-MCNC: 3.4 G/DL (ref 2–4)
GLUCOSE SERPL-MCNC: 100 MG/DL (ref 70–99)
HCT VFR BLD CALC: 41.6 % (ref 39–51)
HGB BLD-MCNC: 14 G/DL (ref 13–17)
IMM GRANULOCYTES # BLD AUTO: 0 K/MCL (ref 0–0.2)
IMM GRANULOCYTES # BLD: 0 %
LYMPHOCYTES # BLD: 2 K/MCL (ref 1–4.8)
LYMPHOCYTES NFR BLD: 25 %
MAGNESIUM SERPL-MCNC: 2.4 MG/DL (ref 1.7–2.4)
MCH RBC QN AUTO: 32.8 PG (ref 26–34)
MCHC RBC AUTO-ENTMCNC: 33.7 G/DL (ref 32–36.5)
MCV RBC AUTO: 97.4 FL (ref 78–100)
MONOCYTES # BLD: 0.7 K/MCL (ref 0.3–0.9)
MONOCYTES NFR BLD: 9 %
NEUTROPHILS # BLD: 5.2 K/MCL (ref 1.8–7.7)
NEUTROPHILS NFR BLD: 65 %
NRBC BLD MANUAL-RTO: 0 /100 WBC
NT-PROBNP SERPL-MCNC: 199 PG/ML
PLATELET # BLD AUTO: 179 K/MCL (ref 140–450)
POTASSIUM SERPL-SCNC: 4 MMOL/L (ref 3.4–5.1)
PROT SERPL-MCNC: 8.1 G/DL (ref 6.4–8.2)
RBC # BLD: 4.27 MIL/MCL (ref 4.5–5.9)
RSV AG NPH QL IA.RAPID: NOT DETECTED
SARS-COV-2 RNA RESP QL NAA+PROBE: NOT DETECTED
SERVICE CMNT-IMP: NORMAL
SERVICE CMNT-IMP: NORMAL
SODIUM SERPL-SCNC: 139 MMOL/L (ref 135–145)
TROPONIN I SERPL DL<=0.01 NG/ML-MCNC: 6 NG/L
TSH SERPL-ACNC: 1.43 MCUNITS/ML (ref 0.35–5)
WBC # BLD: 8.1 K/MCL (ref 4.2–11)

## 2022-11-06 PROCEDURE — 84484 ASSAY OF TROPONIN QUANT: CPT | Performed by: EMERGENCY MEDICINE

## 2022-11-06 PROCEDURE — 83880 ASSAY OF NATRIURETIC PEPTIDE: CPT | Performed by: EMERGENCY MEDICINE

## 2022-11-06 PROCEDURE — 84443 ASSAY THYROID STIM HORMONE: CPT | Performed by: EMERGENCY MEDICINE

## 2022-11-06 PROCEDURE — 85025 COMPLETE CBC W/AUTO DIFF WBC: CPT | Performed by: EMERGENCY MEDICINE

## 2022-11-06 PROCEDURE — 10002805 HB CONTRAST AGENT: Performed by: EMERGENCY MEDICINE

## 2022-11-06 PROCEDURE — 85652 RBC SED RATE AUTOMATED: CPT | Performed by: EMERGENCY MEDICINE

## 2022-11-06 PROCEDURE — 93005 ELECTROCARDIOGRAM TRACING: CPT | Performed by: EMERGENCY MEDICINE

## 2022-11-06 PROCEDURE — 36415 COLL VENOUS BLD VENIPUNCTURE: CPT

## 2022-11-06 PROCEDURE — 0241U COVID/FLU/RSV PANEL: CPT | Performed by: EMERGENCY MEDICINE

## 2022-11-06 PROCEDURE — 80053 COMPREHEN METABOLIC PANEL: CPT | Performed by: EMERGENCY MEDICINE

## 2022-11-06 PROCEDURE — 86140 C-REACTIVE PROTEIN: CPT | Performed by: EMERGENCY MEDICINE

## 2022-11-06 PROCEDURE — G1004 CDSM NDSC: HCPCS

## 2022-11-06 PROCEDURE — 70496 CT ANGIOGRAPHY HEAD: CPT

## 2022-11-06 PROCEDURE — 99223 1ST HOSP IP/OBS HIGH 75: CPT

## 2022-11-06 PROCEDURE — 10006031 HB ROOM CHARGE TELEMETRY

## 2022-11-06 PROCEDURE — 71045 X-RAY EXAM CHEST 1 VIEW: CPT

## 2022-11-06 PROCEDURE — 86038 ANTINUCLEAR ANTIBODIES: CPT | Performed by: EMERGENCY MEDICINE

## 2022-11-06 PROCEDURE — 83735 ASSAY OF MAGNESIUM: CPT | Performed by: EMERGENCY MEDICINE

## 2022-11-06 PROCEDURE — 99285 EMERGENCY DEPT VISIT HI MDM: CPT | Performed by: EMERGENCY MEDICINE

## 2022-11-06 PROCEDURE — C9803 HOPD COVID-19 SPEC COLLECT: HCPCS

## 2022-11-06 PROCEDURE — 83516 IMMUNOASSAY NONANTIBODY: CPT | Performed by: EMERGENCY MEDICINE

## 2022-11-06 PROCEDURE — 99285 EMERGENCY DEPT VISIT HI MDM: CPT

## 2022-11-06 RX ORDER — ENOXAPARIN SODIUM 100 MG/ML
40 INJECTION SUBCUTANEOUS DAILY
Status: DISCONTINUED | OUTPATIENT
Start: 2022-11-07 | End: 2022-11-08 | Stop reason: HOSPADM

## 2022-11-06 RX ORDER — LISINOPRIL AND HYDROCHLOROTHIAZIDE 25; 20 MG/1; MG/1
1 TABLET ORAL DAILY
COMMUNITY
End: 2022-11-07

## 2022-11-06 RX ORDER — 0.9 % SODIUM CHLORIDE 0.9 %
2 VIAL (ML) INJECTION EVERY 12 HOURS SCHEDULED
Status: DISCONTINUED | OUTPATIENT
Start: 2022-11-07 | End: 2022-11-08 | Stop reason: HOSPADM

## 2022-11-06 RX ADMIN — IOHEXOL 75 ML: 350 INJECTION, SOLUTION INTRAVENOUS at 21:05

## 2022-11-06 ASSESSMENT — ENCOUNTER SYMPTOMS
BACK PAIN: 0
ABDOMINAL PAIN: 0
CHILLS: 1
SORE THROAT: 1
EYE REDNESS: 0
HEADACHES: 0
SHORTNESS OF BREATH: 1
FEVER: 0

## 2022-11-06 ASSESSMENT — LIFESTYLE VARIABLES
ARE YOU DEAF OR DO YOU HAVE SERIOUS DIFFICULTY  HEARING: NO
HOW OFTEN DO YOU HAVE 6 OR MORE DRINKS ON ONE OCCASION: LESS THAN MONTHLY
ADL BEFORE ADMISSION: INDEPENDENT
HOW OFTEN DO YOU HAVE A DRINK CONTAINING ALCOHOL: NEVER
SHORT OF BREATH OR FATIGUE WITH ADLS: YES
SMOKING_YEARS: NO
HOW MANY STANDARD DRINKS CONTAINING ALCOHOL DO YOU HAVE ON A TYPICAL DAY: 0,1 OR 2
RECENTLY LOST WEIGHT WITHOUT TRYING: 0
ADL NEEDS ASSIST: NO
RECENT DECLINE IN ADLS: NO
ARE YOU BLIND OR DO YOU HAVE SERIOUS DIFFICULTY SEEING, EVEN WHEN WEARING GLASSES: NO
HISTORY OF PROBLEMS WHEN YOU STOP DRINKING ALCOHOL: NO
HAVE YOU BEEN EATING POORLY BECAUSE OF A DECREASED APPETITE: 1
IS PATIENT ABLE TO COMPLETE ASSESSMENT AT THIS TIME: YES
AUDIT-C TOTAL SCORE: 1
LAST DRINK YOU HAD: DENIES INTAKE
ALCOHOL_USE_STATUS: NO OR LOW RISK WITH VALIDATED TOOL
CHRONIC/CANCER PAIN PRESENT: YES, CHRONIC

## 2022-11-06 ASSESSMENT — COGNITIVE AND FUNCTIONAL STATUS - GENERAL
DO YOU HAVE DIFFICULTY DRESSING OR BATHING: NO
BECAUSE OF A PHYSICAL, MENTAL, OR EMOTIONAL CONDITION, DO YOU HAVE DIFFICULTY DOING ERRANDS ALONE: NO
BECAUSE OF A PHYSICAL, MENTAL, OR EMOTIONAL CONDITION, DO YOU HAVE SERIOUS DIFFICULTY CONCENTRATING, REMEMBERING OR MAKING DECISIONS: NO
DO YOU HAVE SERIOUS DIFFICULTY WALKING OR CLIMBING STAIRS: NO

## 2022-11-06 ASSESSMENT — PATIENT HEALTH QUESTIONNAIRE - PHQ9
2. FEELING DOWN, DEPRESSED OR HOPELESS: NOT AT ALL
CLINICAL INTERPRETATION OF PHQ2 SCORE: NO FURTHER SCREENING NEEDED
1. LITTLE INTEREST OR PLEASURE IN DOING THINGS: NOT AT ALL
SUM OF ALL RESPONSES TO PHQ9 QUESTIONS 1 AND 2: 0
SUM OF ALL RESPONSES TO PHQ9 QUESTIONS 1 AND 2: 0
IS PATIENT ABLE TO COMPLETE PHQ2 OR PHQ9: YES

## 2022-11-06 ASSESSMENT — COLUMBIA-SUICIDE SEVERITY RATING SCALE - C-SSRS
6. HAVE YOU EVER DONE ANYTHING, STARTED TO DO ANYTHING, OR PREPARED TO DO ANYTHING TO END YOUR LIFE?: NO
1. WITHIN THE PAST MONTH, HAVE YOU WISHED YOU WERE DEAD OR WISHED YOU COULD GO TO SLEEP AND NOT WAKE UP?: NO
IS THE PATIENT ABLE TO COMPLETE C-SSRS: YES
2. HAVE YOU ACTUALLY HAD ANY THOUGHTS OF KILLING YOURSELF?: NO

## 2022-11-06 ASSESSMENT — PAIN SCALES - GENERAL: PAINLEVEL_OUTOF10: 3

## 2022-11-06 ASSESSMENT — ACTIVITIES OF DAILY LIVING (ADL): ADL_SCORE: 12

## 2022-11-07 ENCOUNTER — APPOINTMENT (OUTPATIENT)
Dept: CARDIOLOGY | Age: 37
DRG: 645 | End: 2022-11-07
Attending: EMERGENCY MEDICINE

## 2022-11-07 LAB
ALBUMIN SERPL-MCNC: 4 G/DL (ref 3.6–5.1)
ALBUMIN/GLOB SERPL: 1.3 {RATIO} (ref 1–2.4)
ALP SERPL-CCNC: 75 UNITS/L (ref 45–117)
ALT SERPL-CCNC: 22 UNITS/L
ANA SER QL IA: NEGATIVE
ANION GAP SERPL CALC-SCNC: 11 MMOL/L (ref 7–19)
AORTIC VALVE AREA (AVA): 1.02
AORTIC VALVE AREA: 4.98
APPEARANCE UR: CLEAR
ASCENDING AORTA (AAD): 8
AST SERPL-CCNC: 20 UNITS/L
ATRIAL RATE (BPM): 68
AV MEAN GRADIENT (AVMG): 5
AV PEAK GRADIENT (AVPG): 12
AVI LVOT PEAK GRADIENT (LVOTMG): 0.9
BACTERIA #/AREA URNS HPF: ABNORMAL /HPF
BASOPHILS # BLD: 0 K/MCL (ref 0–0.3)
BASOPHILS NFR BLD: 0 %
BILIRUB SERPL-MCNC: 0.9 MG/DL (ref 0.2–1)
BILIRUB UR QL STRIP: NEGATIVE
BUN SERPL-MCNC: 15 MG/DL (ref 6–20)
BUN/CREAT SERPL: 18 (ref 7–25)
CALCIUM SERPL-MCNC: 8.9 MG/DL (ref 8.4–10.2)
CAOX CRY URNS QL MICRO: PRESENT
CHLORIDE SERPL-SCNC: 110 MMOL/L (ref 97–110)
CO2 SERPL-SCNC: 24 MMOL/L (ref 21–32)
COLOR UR: YELLOW
CORTIS AM PEAK SERPL-MCNC: 11.5 MCG/DL (ref 5.2–22.5)
CREAT SERPL-MCNC: 0.83 MG/DL (ref 0.67–1.17)
CREAT UR-MCNC: 450 MG/DL
CREAT UR-MCNC: 464 MG/DL
DEPRECATED RDW RBC: 47.7 FL (ref 39–50)
E WAVE DECELARATION TIME (MDT): 10.66
EOSINOPHIL # BLD: 0.2 K/MCL (ref 0–0.5)
EOSINOPHIL NFR BLD: 3 %
ERYTHROCYTE [DISTWIDTH] IN BLOOD: 13.4 % (ref 11–15)
FASTING DURATION TIME PATIENT: NORMAL H
GFR SERPLBLD BASED ON 1.73 SQ M-ARVRAT: >90 ML/MIN
GLOBULIN SER-MCNC: 3.1 G/DL (ref 2–4)
GLUCOSE SERPL-MCNC: 88 MG/DL (ref 70–99)
GLUCOSE UR STRIP-MCNC: NEGATIVE MG/DL
HBA1C MFR BLD: 4.8 % (ref 4.5–5.6)
HCT VFR BLD CALC: 38.4 % (ref 39–51)
HGB BLD-MCNC: 13.2 G/DL (ref 13–17)
HGB UR QL STRIP: NEGATIVE
HYALINE CASTS #/AREA URNS LPF: ABNORMAL /LPF
IMM GRANULOCYTES # BLD AUTO: 0 K/MCL (ref 0–0.2)
IMM GRANULOCYTES # BLD: 0 %
KETONES UR STRIP-MCNC: ABNORMAL MG/DL
LEFT INTERNAL DIMENSION IN SYSTOLE (LVSD): 1
LEFT VENTRICULAR INTERNAL DIMENSION IN DIASTOLE (LVDD): 3.4
LEFT VENTRICULAR POSTERIOR WALL IN END DIASTOLE (LVPW): 5.7
LEUKOCYTE ESTERASE UR QL STRIP: NEGATIVE
LV EF: NORMAL %
LVOT 2D (LVOTD): 28.7
LVOT VTI (LVOTVTI): 1.25
LYMPHOCYTES # BLD: 2.2 K/MCL (ref 1–4.8)
LYMPHOCYTES NFR BLD: 34 %
MCH RBC QN AUTO: 33.3 PG (ref 26–34)
MCHC RBC AUTO-ENTMCNC: 34.4 G/DL (ref 32–36.5)
MCV RBC AUTO: 97 FL (ref 78–100)
MONOCYTES # BLD: 0.7 K/MCL (ref 0.3–0.9)
MONOCYTES NFR BLD: 10 %
MUCOUS THREADS URNS QL MICRO: PRESENT
MV E TISSUE VEL LAT (MELV): 0.92
MV E TISSUE VEL MED (MESV): 13.4
MV E WAVE VEL/E TISSUE VEL MED(MSR): 9.57
MV PEAK A VELOCITY (MVPAV): 281
MV PEAK E VELOCITY (MVPEV): 0.79
NEUTROPHILS # BLD: 3.4 K/MCL (ref 1.8–7.7)
NEUTROPHILS NFR BLD: 53 %
NITRITE UR QL STRIP: NEGATIVE
NRBC BLD MANUAL-RTO: 0 /100 WBC
P AXIS (DEGREES): 26
PH UR STRIP: 6 [PH] (ref 5–7)
PLATELET # BLD AUTO: 156 K/MCL (ref 140–450)
POTASSIUM SERPL-SCNC: 3.8 MMOL/L (ref 3.4–5.1)
PR-INTERVAL (MSEC): 170
PROT SERPL-MCNC: 7.1 G/DL (ref 6.4–8.2)
PROT UR STRIP-MCNC: 100 MG/DL
PROT UR-MCNC: 27 MG/DL
PROT/CREAT UR: 58 MGPR/GCR
QRS-INTERVAL (MSEC): 96
QT-INTERVAL (MSEC): 430
QTC: 457
R AXIS (DEGREES): 47
RAINBOW EXTRA TUBES HOLD SPECIMEN: NORMAL
RBC # BLD: 3.96 MIL/MCL (ref 4.5–5.9)
RBC #/AREA URNS HPF: ABNORMAL /HPF
REPORT TEXT: NORMAL
RV END SYSTOLIC LONGITUDINAL STRAIN FREE WALL (RVGS): 2.7
SODIUM SERPL-SCNC: 141 MMOL/L (ref 135–145)
SODIUM UR-SCNC: 128 MMOL/L
SP GR UR STRIP: >1.03 (ref 1–1.03)
SQUAMOUS #/AREA URNS HPF: ABNORMAL /HPF
T AXIS (DEGREES): 36
TRICUSPID VALVE ANNULAR PEAK VELOCITY (TVAPV): 34
TRICUSPID VALVE PEAK REGURGITATION VELOCITY (TRPV): 3.8
UROBILINOGEN UR STRIP-MCNC: 2 MG/DL
VENTRICULAR RATE EKG/MIN (BPM): 68
WBC # BLD: 6.5 K/MCL (ref 4.2–11)
WBC #/AREA URNS HPF: ABNORMAL /HPF

## 2022-11-07 PROCEDURE — 80053 COMPREHEN METABOLIC PANEL: CPT

## 2022-11-07 PROCEDURE — 83036 HEMOGLOBIN GLYCOSYLATED A1C: CPT

## 2022-11-07 PROCEDURE — 99254 IP/OBS CNSLTJ NEW/EST MOD 60: CPT | Performed by: INTERNAL MEDICINE

## 2022-11-07 PROCEDURE — 81001 URINALYSIS AUTO W/SCOPE: CPT | Performed by: EMERGENCY MEDICINE

## 2022-11-07 PROCEDURE — 10004651 HB RX, NO CHARGE ITEM

## 2022-11-07 PROCEDURE — 84165 PROTEIN E-PHORESIS SERUM: CPT

## 2022-11-07 PROCEDURE — 82088 ASSAY OF ALDOSTERONE: CPT | Performed by: INTERNAL MEDICINE

## 2022-11-07 PROCEDURE — 83835 ASSAY OF METANEPHRINES: CPT

## 2022-11-07 PROCEDURE — 10002803 HB RX 637: Performed by: INTERNAL MEDICINE

## 2022-11-07 PROCEDURE — 83835 ASSAY OF METANEPHRINES: CPT | Performed by: EMERGENCY MEDICINE

## 2022-11-07 PROCEDURE — 84244 ASSAY OF RENIN: CPT | Performed by: INTERNAL MEDICINE

## 2022-11-07 PROCEDURE — 82308 ASSAY OF CALCITONIN: CPT

## 2022-11-07 PROCEDURE — 83497 ASSAY OF 5-HIAA: CPT | Performed by: EMERGENCY MEDICINE

## 2022-11-07 PROCEDURE — 85025 COMPLETE CBC W/AUTO DIFF WBC: CPT

## 2022-11-07 PROCEDURE — 10006031 HB ROOM CHARGE TELEMETRY

## 2022-11-07 PROCEDURE — 10002805 HB CONTRAST AGENT: Performed by: INTERNAL MEDICINE

## 2022-11-07 PROCEDURE — 84156 ASSAY OF PROTEIN URINE: CPT | Performed by: INTERNAL MEDICINE

## 2022-11-07 PROCEDURE — 86316 IMMUNOASSAY TUMOR OTHER: CPT

## 2022-11-07 PROCEDURE — 84586 ASSAY OF VIP: CPT

## 2022-11-07 PROCEDURE — 83835 ASSAY OF METANEPHRINES: CPT | Performed by: INTERNAL MEDICINE

## 2022-11-07 PROCEDURE — 83520 IMMUNOASSAY QUANT NOS NONAB: CPT

## 2022-11-07 PROCEDURE — 84260 ASSAY OF SEROTONIN: CPT

## 2022-11-07 PROCEDURE — 82533 TOTAL CORTISOL: CPT | Performed by: INTERNAL MEDICINE

## 2022-11-07 PROCEDURE — 83491 ASY HYDROXYCORTICOSTEROIDS17: CPT | Performed by: INTERNAL MEDICINE

## 2022-11-07 PROCEDURE — 84300 ASSAY OF URINE SODIUM: CPT

## 2022-11-07 PROCEDURE — 82570 ASSAY OF URINE CREATININE: CPT

## 2022-11-07 PROCEDURE — 36415 COLL VENOUS BLD VENIPUNCTURE: CPT

## 2022-11-07 PROCEDURE — 82384 ASSAY THREE CATECHOLAMINES: CPT

## 2022-11-07 PROCEDURE — 93306 TTE W/DOPPLER COMPLETE: CPT

## 2022-11-07 RX ORDER — LOSARTAN POTASSIUM 50 MG/1
50 TABLET ORAL DAILY
Status: DISCONTINUED | OUTPATIENT
Start: 2022-11-07 | End: 2022-11-08 | Stop reason: HOSPADM

## 2022-11-07 RX ADMIN — LOSARTAN POTASSIUM 50 MG: 50 TABLET, FILM COATED ORAL at 15:12

## 2022-11-07 RX ADMIN — PERFLUTREN 2 ML: 6.52 INJECTION, SUSPENSION INTRAVENOUS at 08:26

## 2022-11-07 RX ADMIN — SODIUM CHLORIDE 2 ML: 9 INJECTION, SOLUTION INTRAMUSCULAR; INTRAVENOUS; SUBCUTANEOUS at 09:59

## 2022-11-07 RX ADMIN — SODIUM CHLORIDE 2 ML: 9 INJECTION, SOLUTION INTRAMUSCULAR; INTRAVENOUS; SUBCUTANEOUS at 21:25

## 2022-11-07 ASSESSMENT — PAIN SCALES - GENERAL
PAINLEVEL_OUTOF10: 0
PAINLEVEL_OUTOF10: 0

## 2022-11-08 ENCOUNTER — APPOINTMENT (OUTPATIENT)
Dept: CARDIOLOGY | Age: 37
DRG: 645 | End: 2022-11-08
Attending: INTERNAL MEDICINE

## 2022-11-08 VITALS
RESPIRATION RATE: 18 BRPM | SYSTOLIC BLOOD PRESSURE: 143 MMHG | DIASTOLIC BLOOD PRESSURE: 84 MMHG | WEIGHT: 257 LBS | HEART RATE: 81 BPM | HEIGHT: 74 IN | OXYGEN SATURATION: 96 % | BODY MASS INDEX: 32.98 KG/M2 | TEMPERATURE: 99 F

## 2022-11-08 LAB
ALBUMIN SERPL-MCNC: 4.1 G/DL (ref 3.6–5.1)
ALBUMIN/GLOB SERPL: 1.4 {RATIO} (ref 1–2.4)
ALDOST SERPL-MCNC: 11.9 NG/DL
ALP SERPL-CCNC: 76 UNITS/L (ref 45–117)
ALT SERPL-CCNC: 23 UNITS/L
ANION GAP SERPL CALC-SCNC: 9 MMOL/L (ref 7–19)
ASCENDING AORTA (AAD): 8
AST SERPL-CCNC: 14 UNITS/L
BILIRUB SERPL-MCNC: 1.3 MG/DL (ref 0.2–1)
BUN SERPL-MCNC: 13 MG/DL (ref 6–20)
BUN/CREAT SERPL: 16 (ref 7–25)
CALCIUM SERPL-MCNC: 9.1 MG/DL (ref 8.4–10.2)
CHLORIDE SERPL-SCNC: 108 MMOL/L (ref 97–110)
CO2 SERPL-SCNC: 25 MMOL/L (ref 21–32)
COLLECT DURATION TIME UR: 24 HRS
COLLECT DURATION TIME UR: 24 HRS
CREAT 24H UR-MCNC: 207 MG/DL
CREAT 24H UR-MRATE: 2.07 G/24 HR (ref 0.87–2.41)
CREAT SERPL-MCNC: 0.8 MG/DL (ref 0.67–1.17)
CRP SERPL-MCNC: 0.6 MG/DL
DEPRECATED RDW RBC: 48.8 FL (ref 39–50)
ERYTHROCYTE [DISTWIDTH] IN BLOOD: 13.4 % (ref 11–15)
FASTING DURATION TIME PATIENT: ABNORMAL H
GFR SERPLBLD BASED ON 1.73 SQ M-ARVRAT: >90 ML/MIN
GLOBULIN SER-MCNC: 2.9 G/DL (ref 2–4)
GLUCOSE SERPL-MCNC: 92 MG/DL (ref 70–99)
HCT VFR BLD CALC: 39.5 % (ref 39–51)
HGB BLD-MCNC: 13.1 G/DL (ref 13–17)
LV EF: NORMAL %
MAGNESIUM SERPL-MCNC: 2.4 MG/DL (ref 1.7–2.4)
MCH RBC QN AUTO: 32.8 PG (ref 26–34)
MCHC RBC AUTO-ENTMCNC: 33.2 G/DL (ref 32–36.5)
MCV RBC AUTO: 99 FL (ref 78–100)
MYELOPEROXIDASE AB SER-ACNC: <0.2 AI
NRBC BLD MANUAL-RTO: 0 /100 WBC
NT-PROBNP SERPL-MCNC: 79 PG/ML
PLATELET # BLD AUTO: 150 K/MCL (ref 140–450)
POTASSIUM SERPL-SCNC: 3.6 MMOL/L (ref 3.4–5.1)
PROT SERPL-MCNC: 7 G/DL (ref 6.4–8.2)
PROTEINASE3 AB SER-ACNC: <0.2 AI
RBC # BLD: 3.99 MIL/MCL (ref 4.5–5.9)
SODIUM 24H UR-SRATE: 77 MMOL/24 HR (ref 40–220)
SODIUM SERPL-SCNC: 138 MMOL/L (ref 135–145)
SODIUM UR-SCNC: 77 MMOL/L
SPECIMEN VOL UR: 1000 ML
SPECIMEN VOL UR: 1000 ML
WBC # BLD: 5.8 K/MCL (ref 4.2–11)

## 2022-11-08 PROCEDURE — 93312 ECHO TRANSESOPHAGEAL: CPT

## 2022-11-08 PROCEDURE — 10002800 HB RX 250 W HCPCS: Performed by: INTERNAL MEDICINE

## 2022-11-08 PROCEDURE — 82570 ASSAY OF URINE CREATININE: CPT | Performed by: STUDENT IN AN ORGANIZED HEALTH CARE EDUCATION/TRAINING PROGRAM

## 2022-11-08 PROCEDURE — 99223 1ST HOSP IP/OBS HIGH 75: CPT | Performed by: PSYCHIATRY & NEUROLOGY

## 2022-11-08 PROCEDURE — 36415 COLL VENOUS BLD VENIPUNCTURE: CPT | Performed by: INTERNAL MEDICINE

## 2022-11-08 PROCEDURE — 85027 COMPLETE CBC AUTOMATED: CPT

## 2022-11-08 PROCEDURE — 82384 ASSAY THREE CATECHOLAMINES: CPT | Performed by: STUDENT IN AN ORGANIZED HEALTH CARE EDUCATION/TRAINING PROGRAM

## 2022-11-08 PROCEDURE — 80053 COMPREHEN METABOLIC PANEL: CPT

## 2022-11-08 PROCEDURE — 84300 ASSAY OF URINE SODIUM: CPT | Performed by: STUDENT IN AN ORGANIZED HEALTH CARE EDUCATION/TRAINING PROGRAM

## 2022-11-08 PROCEDURE — 10004651 HB RX, NO CHARGE ITEM

## 2022-11-08 PROCEDURE — B24BZZ4 ULTRASONOGRAPHY OF HEART WITH AORTA, TRANSESOPHAGEAL: ICD-10-PCS | Performed by: INTERNAL MEDICINE

## 2022-11-08 PROCEDURE — 99239 HOSP IP/OBS DSCHRG MGMT >30: CPT

## 2022-11-08 PROCEDURE — 10002801 HB RX 250 W/O HCPCS: Performed by: INTERNAL MEDICINE

## 2022-11-08 PROCEDURE — 82530 CORTISOL FREE: CPT | Performed by: STUDENT IN AN ORGANIZED HEALTH CARE EDUCATION/TRAINING PROGRAM

## 2022-11-08 PROCEDURE — 13000001 HB PHASE II RECOVERY EA 30 MINUTES

## 2022-11-08 PROCEDURE — 10002803 HB RX 637: Performed by: INTERNAL MEDICINE

## 2022-11-08 PROCEDURE — 83880 ASSAY OF NATRIURETIC PEPTIDE: CPT | Performed by: INTERNAL MEDICINE

## 2022-11-08 PROCEDURE — 99153 MOD SED SAME PHYS/QHP EA: CPT

## 2022-11-08 PROCEDURE — 86140 C-REACTIVE PROTEIN: CPT | Performed by: INTERNAL MEDICINE

## 2022-11-08 PROCEDURE — 83735 ASSAY OF MAGNESIUM: CPT | Performed by: INTERNAL MEDICINE

## 2022-11-08 PROCEDURE — 99232 SBSQ HOSP IP/OBS MODERATE 35: CPT | Performed by: INTERNAL MEDICINE

## 2022-11-08 PROCEDURE — 99152 MOD SED SAME PHYS/QHP 5/>YRS: CPT

## 2022-11-08 RX ORDER — LOSARTAN POTASSIUM 50 MG/1
50 TABLET ORAL DAILY
Qty: 30 TABLET | Refills: 3 | Status: ON HOLD | OUTPATIENT
Start: 2022-11-08 | End: 2023-03-07 | Stop reason: SDUPTHER

## 2022-11-08 RX ORDER — LIDOCAINE HYDROCHLORIDE 20 MG/ML
SOLUTION OROPHARYNGEAL PRN
Status: COMPLETED | OUTPATIENT
Start: 2022-11-08 | End: 2022-11-08

## 2022-11-08 RX ORDER — MIDAZOLAM HYDROCHLORIDE 1 MG/ML
INJECTION, SOLUTION INTRAMUSCULAR; INTRAVENOUS PRN
Status: COMPLETED | OUTPATIENT
Start: 2022-11-08 | End: 2022-11-08

## 2022-11-08 RX ADMIN — MIDAZOLAM HYDROCHLORIDE 1 MG: 1 INJECTION, SOLUTION INTRAMUSCULAR; INTRAVENOUS at 10:01

## 2022-11-08 RX ADMIN — FENTANYL CITRATE 50 MCG: 50 INJECTION, SOLUTION INTRAMUSCULAR; INTRAVENOUS at 09:57

## 2022-11-08 RX ADMIN — MIDAZOLAM HYDROCHLORIDE 1 MG: 1 INJECTION, SOLUTION INTRAMUSCULAR; INTRAVENOUS at 10:11

## 2022-11-08 RX ADMIN — FENTANYL CITRATE 25 MCG: 50 INJECTION, SOLUTION INTRAMUSCULAR; INTRAVENOUS at 10:04

## 2022-11-08 RX ADMIN — MIDAZOLAM HYDROCHLORIDE 2 MG: 1 INJECTION, SOLUTION INTRAMUSCULAR; INTRAVENOUS at 09:57

## 2022-11-08 RX ADMIN — SODIUM CHLORIDE 2 ML: 9 INJECTION, SOLUTION INTRAMUSCULAR; INTRAVENOUS; SUBCUTANEOUS at 15:34

## 2022-11-08 RX ADMIN — MIDAZOLAM HYDROCHLORIDE 1 MG: 1 INJECTION, SOLUTION INTRAMUSCULAR; INTRAVENOUS at 10:19

## 2022-11-08 RX ADMIN — FENTANYL CITRATE 25 MCG: 50 INJECTION, SOLUTION INTRAMUSCULAR; INTRAVENOUS at 10:01

## 2022-11-08 RX ADMIN — MIDAZOLAM HYDROCHLORIDE 2 MG: 1 INJECTION, SOLUTION INTRAMUSCULAR; INTRAVENOUS at 10:03

## 2022-11-08 RX ADMIN — LIDOCAINE HYDROCHLORIDE 15 ML: 20 SOLUTION ORAL; TOPICAL at 09:43

## 2022-11-08 RX ADMIN — LOSARTAN POTASSIUM 50 MG: 50 TABLET, FILM COATED ORAL at 15:33

## 2022-11-08 RX ADMIN — MIDAZOLAM HYDROCHLORIDE 1 MG: 1 INJECTION, SOLUTION INTRAMUSCULAR; INTRAVENOUS at 10:08

## 2022-11-08 ASSESSMENT — PAIN SCALES - GENERAL
PAINLEVEL_OUTOF10: 0

## 2022-11-09 LAB
ALBUMIN SERPL ELPH-MCNC: 4.2 G/DL (ref 3.5–4.9)
ALPHA 1: 0.3 G/DL (ref 0.2–0.4)
ALPHA2 GLOB SERPL ELPH-MCNC: 0.6 G/DL (ref 0.5–0.9)
B-GLOBULIN SERPL ELPH-MCNC: 0.6 G/DL (ref 0.7–1.2)
CALCIT SERPL-MCNC: 2.5 PG/ML (ref 0–7.5)
GAMMA GLOB SERPL ELPH-MCNC: 0.9 G/DL (ref 0.7–1.7)
GLOBULIN SER-MCNC: 2.5 G/DL (ref 2.1–4.2)
PATH INTERP SPEC-IMP: ABNORMAL
PROT SERPL-MCNC: 6.7 G/DL (ref 6.4–8.2)
TRYPTASE SERPL-MCNC: 2.2 UG/L

## 2022-11-10 ENCOUNTER — TELEPHONE (OUTPATIENT)
Dept: INTERNAL MEDICINE | Age: 37
End: 2022-11-10

## 2022-11-10 ENCOUNTER — TELEPHONE (OUTPATIENT)
Dept: FAMILY MEDICINE | Age: 37
End: 2022-11-10

## 2022-11-10 LAB
5HIAA & CREATININE UR-IMP: NORMAL
5OH-INDOLEACETATE 24H UR-MCNC: 7.7 MG/L
5OH-INDOLEACETATE 24H UR-MRATE: NORMAL MG/D (ref 0–15)
5OH-INDOLEACETATE/CREAT 24H UR: 2 MG/GCR (ref 0–14)
ALDOST SERPL-MCNC: 8.3 NG/DL
ALDOST/RENIN PLAS-RTO: 2.9 RATIO
ANNOTATION COMMENT IMP: NORMAL
CGA SERPL-MCNC: 42 NG/ML (ref 0–103)
COLLECT DURATION TIME SPEC: NORMAL HR
CREAT 24H UR-MRATE: NORMAL MG/D (ref 1000–2500)
CREAT UR-MCNC: 482 MG/DL
METANEPHS SERPL-SCNC: 0.16 NMOL/L (ref 0–0.49)
NORMETANEPH FREE SERPL-SCNC: 0.28 NMOL/L (ref 0–0.89)
RENIN PLAS-CCNC: 2.2 NG/ML/HR
RENIN PLAS-CCNC: 2.9 NG/ML/HR
RENIN PLAS-CCNC: NORMAL NG/ML/H
SEROTONIN SER-MCNC: 65 NG/ML (ref 50–220)
SPECIMEN VOL ?TM UR: NORMAL ML

## 2022-11-11 LAB
ANNOTATION COMMENT IMP: ABNORMAL
ANNOTATION COMMENT IMP: NORMAL
COLLECT DURATION TIME SPEC: 24 HR
CORTIS F 24H UR HPLC-MCNC: 61.3 UG/L
CORTIS F 24H UR-MRATE: 61.3 UG/D
CORTIS F/CREAT 24H UR: 31.44 UG/G CRT
CREAT 24H UR-MRATE: 1950 MG/D (ref 1000–2500)
CREAT UR-MCNC: 195 MG/DL
METANEPHS SERPL-SCNC: 0.14 NMOL/L (ref 0–0.49)
NORMETANEPH FREE SERPL-SCNC: 0.2 NMOL/L (ref 0–0.89)
SPECIMEN VOL ?TM UR: 1000 ML
VIP SERPL-MCNC: <13 PG/ML (ref 0–60)

## 2022-11-12 LAB
CATECHOLS UR-IMP: NORMAL
CATECHOLS UR-IMP: NORMAL
COLLECT DURATION TIME SPEC: 24 HR
COLLECT DURATION TIME SPEC: NORMAL HR
COLLECT DURATION TIME SPEC: NORMAL HR
CREAT 24H UR-MRATE: 1940 MG/D (ref 1000–2500)
CREAT 24H UR-MRATE: NORMAL MG/D (ref 1000–2500)
CREAT 24H UR-MRATE: NORMAL MG/D (ref 1000–2500)
CREAT UR-MCNC: 194 MG/DL
CREAT UR-MCNC: 460 MG/DL
CREAT UR-MCNC: 472 MG/DL
DOPAMINE 24H UR-MRATE: 344 UG/D (ref 71–485)
DOPAMINE 24H UR-MRATE: NORMAL UG/D (ref 71–485)
DOPAMINE UR-MCNC: 344 UG/L
DOPAMINE UR-MCNC: 777 UG/L
DOPAMINE/CREAT UR: 165 UG/G CRT (ref 0–250)
DOPAMINE/CREAT UR: 177 UG/G CRT (ref 0–250)
EPINEPH 24H UR-MRATE: 10 UG/D (ref 1–14)
EPINEPH 24H UR-MRATE: NORMAL (ref 1–14)
EPINEPH UR-MCNC: 10 UG/L
EPINEPH UR-MCNC: 37 UG/L
EPINEPH/CREAT UR: 5 UG/G CRT (ref 0–20)
EPINEPH/CREAT UR: 8 UG/G CRT (ref 0–20)
METANEPH 24H UR-MCNC: 323 UG/L
METANEPH 24H UR-MRATE: NORMAL UG/D (ref 55–320)
METANEPH+NORMETANEPH UR-IMP: NORMAL
METANEPH/CREAT 24H UR: 70 UG/G CRT (ref 0–300)
NOREPINEPH 24H UR-MRATE: 45 UG/D (ref 14–120)
NOREPINEPH 24H UR-MRATE: NORMAL UG/D (ref 14–120)
NOREPINEPH UR-MCNC: 45 UG/L
NOREPINEPH UR-MCNC: 91 UG/L
NOREPINEPH/CREAT UR: 19 UG/G CRT (ref 0–45)
NOREPINEPH/CREAT UR: 23 UG/G CRT (ref 0–45)
NORMETANEPHRINE 24H UR-MCNC: 566 UG/L
NORMETANEPHRINE 24H UR-MRATE: NORMAL UG/D (ref 114–865)
NORMETANEPHRINE/CREAT 24H UR: 123 UG/G CRT (ref 0–400)
SPECIMEN VOL ?TM UR: 100 ML
SPECIMEN VOL ?TM UR: 1000 ML
SPECIMEN VOL ?TM UR: 8 ML

## 2022-11-15 ENCOUNTER — TELEPHONE (OUTPATIENT)
Dept: SCHEDULING | Age: 37
End: 2022-11-15

## 2022-11-17 DIAGNOSIS — E27.8 ADRENAL MASS (CMD): Primary | ICD-10-CM

## 2022-11-18 LAB
17OHCS 24H UR-MRATE: NORMAL MG/D (ref 4–14)
17OHCS/CREAT 24H UR: NORMAL MG/GCR (ref 2–6.5)
COLLECT DURATION TIME SPEC: NORMAL HR
CREAT 24H UR-MRATE: NORMAL MG/D (ref 1000–2500)
CREAT UR-MCNC: 469 MG/DL
SPECIMEN VOL ?TM UR: NORMAL ML

## 2022-11-28 ENCOUNTER — EXTERNAL LAB (OUTPATIENT)
Dept: OTHER | Age: 37
End: 2022-11-28

## 2022-11-28 LAB — LAB RESULT: NORMAL

## 2022-11-30 ENCOUNTER — APPOINTMENT (OUTPATIENT)
Dept: MRI IMAGING | Age: 37
End: 2022-11-30
Attending: INTERNAL MEDICINE

## 2022-12-07 ENCOUNTER — TELEPHONE (OUTPATIENT)
Dept: ENDOCRINOLOGY | Age: 37
End: 2022-12-07

## 2022-12-07 DIAGNOSIS — R21 RASH: ICD-10-CM

## 2022-12-07 DIAGNOSIS — R00.2 PALPITATION: Primary | ICD-10-CM

## 2022-12-07 DIAGNOSIS — R79.89: ICD-10-CM

## 2022-12-07 DIAGNOSIS — R82.90 ABNORMAL RESULT ON SCREENING URINE TEST: ICD-10-CM

## 2022-12-20 ENCOUNTER — TELEPHONE (OUTPATIENT)
Dept: ENDOCRINOLOGY | Age: 37
End: 2022-12-20

## 2022-12-22 ENCOUNTER — IMAGING SERVICES (OUTPATIENT)
Dept: PET IMAGING | Age: 37
End: 2022-12-22
Attending: STUDENT IN AN ORGANIZED HEALTH CARE EDUCATION/TRAINING PROGRAM

## 2022-12-22 DIAGNOSIS — R00.2 PALPITATION: ICD-10-CM

## 2022-12-22 DIAGNOSIS — R82.90 ABNORMAL RESULT ON SCREENING URINE TEST: ICD-10-CM

## 2022-12-22 DIAGNOSIS — R21 RASH: ICD-10-CM

## 2022-12-22 PROCEDURE — 78815 PET IMAGE W/CT SKULL-THIGH: CPT | Performed by: NUCLEAR MEDICINE

## 2022-12-22 PROCEDURE — G1004 CDSM NDSC: HCPCS | Performed by: NUCLEAR MEDICINE

## 2022-12-22 PROCEDURE — A9587 GALLIUM GA-68: HCPCS | Performed by: NUCLEAR MEDICINE

## 2022-12-23 ENCOUNTER — APPOINTMENT (OUTPATIENT)
Dept: PET IMAGING | Age: 37
End: 2022-12-23
Attending: STUDENT IN AN ORGANIZED HEALTH CARE EDUCATION/TRAINING PROGRAM

## 2023-01-23 ENCOUNTER — APPOINTMENT (OUTPATIENT)
Dept: MRI IMAGING | Age: 38
End: 2023-01-23
Attending: INTERNAL MEDICINE

## 2023-03-02 RX ORDER — LOSARTAN POTASSIUM 50 MG/1
50 TABLET ORAL DAILY
Qty: 30 TABLET | Refills: 3 | OUTPATIENT
Start: 2023-03-02 | End: 2023-04-01

## 2023-03-04 ENCOUNTER — EXTERNAL LAB (OUTPATIENT)
Dept: OTHER | Age: 38
End: 2023-03-04

## 2023-03-04 ENCOUNTER — LAB ENCOUNTER (OUTPATIENT)
Dept: LAB | Facility: HOSPITAL | Age: 38
End: 2023-03-04
Attending: INTERNAL MEDICINE
Payer: COMMERCIAL

## 2023-03-04 DIAGNOSIS — I42.9 SECONDARY CARDIOMYOPATHY, UNSPECIFIED (HCC): ICD-10-CM

## 2023-03-04 DIAGNOSIS — Z01.810 PRE-OPERATIVE CARDIOVASCULAR EXAMINATION: Primary | ICD-10-CM

## 2023-03-04 DIAGNOSIS — I10 HYPERTENSION, UNSPECIFIED TYPE: ICD-10-CM

## 2023-03-04 LAB
ABSOLUTE IMMATURE GRANULOCYTES (OFFPRE24): 0.02 X10(3) UL (ref 0–1)
ALBUMIN SERPL-MCNC: 4.3 G/DL (ref 3.4–5)
ALBUMIN SERPL-MCNC: 4.3 G/DL (ref 3.4–5)
ALBUMIN/GLOB SERPL: 1.3 {RATIO} (ref 1–2)
ALBUMIN/GLOB SERPL: 1.3 {RATIO} (ref 1–2)
ALP LIVER SERPL-CCNC: 106 U/L
ALP SERPL-CCNC: 106 U/L (ref 45–117)
ALT SERPL-CCNC: 28 U/L
ALT SERPL-CCNC: 28 U/L (ref 16–61)
ANION GAP SERPL CALC-SCNC: 6 MMOL/L (ref 0–18)
ANION GAP SERPL CALC-SCNC: 6 MMOL/L (ref 0–18)
AST SERPL-CCNC: 17 U/L (ref 15–37)
AST SERPL-CCNC: 17 U/L (ref 15–37)
ATRIAL RATE: 65 BPM
BASOPHILS # BLD AUTO: 0.04 X10(3) UL (ref 0–0.2)
BASOPHILS # BLD: 0.04 X10(3) UL (ref 0–0.2)
BASOPHILS NFR BLD AUTO: 0.6 %
BASOPHILS NFR BLD: 0.6 %
BILIRUB SERPL-MCNC: 1.1 MG/DL (ref 0.1–2)
BILIRUB SERPL-MCNC: 1.1 MG/DL (ref 0.1–2)
BUN BLD-MCNC: 18 MG/DL (ref 7–18)
BUN SERPL-MCNC: 18 MG/DL (ref 7–18)
BUN/CREAT SERPL: 20.2 (ref 10–20)
BUN/CREAT SERPL: 20.2 (ref 10–20)
CALCIUM BLD-MCNC: 9.4 MG/DL (ref 8.5–10.1)
CALCIUM SERPL-MCNC: 9.4 MG/DL (ref 8.5–10.1)
CALCULATED OSMO: 292 MOSM/KG (ref 275–295)
CHLORIDE SERPL-SCNC: 107 MMOL/L (ref 98–112)
CHLORIDE SERPL-SCNC: 107 MMOL/L (ref 98–112)
CO2 SERPL-SCNC: 27 MMOL/L (ref 21–32)
CO2 SERPL-SCNC: 27 MMOL/L (ref 21–32)
CREAT BLD-MCNC: 0.89 MG/DL
CREAT SERPL-MCNC: 0.89 MG/DL (ref 0.7–1.3)
DEPRECATED RDW RBC AUTO: 49 FL (ref 35.1–46.3)
EOSINOPHIL # BLD AUTO: 0.25 X10(3) UL (ref 0–0.7)
EOSINOPHIL # BLD: 0.25 X10(3) UL (ref 0–0.7)
EOSINOPHIL NFR BLD AUTO: 3.9 %
EOSINOPHIL NFR BLD: 3.9 %
ERYTHROCYTE [DISTWIDTH] IN BLOOD BY AUTOMATED COUNT: 13.2 % (ref 11–15)
ERYTHROCYTE [DISTWIDTH] IN BLOOD BY AUTOMATED COUNT: 49 FL (ref 35.1–46.3)
ERYTHROCYTE [DISTWIDTH] IN BLOOD: 13.2 % (ref 11–15)
FASTING STATUS PATIENT QL REPORTED: YES
GFR SERPLBLD BASED ON 1.73 SQ M-ARVRAT: 113 ML/MIN/1.73M2 (ref 60–?)
GFR SERPLBLD SCHWARTZ-ARVRAT: 113 ML/MIN/1.73M2
GLOBULIN PLAS-MCNC: 3.2 G/DL (ref 2.8–4.4)
GLOBULIN SER-MCNC: 3.2 G/DL (ref 2.8–4.4)
GLUCOSE BLD-MCNC: 92 MG/DL (ref 70–99)
GLUCOSE SERPL-MCNC: 92 MG/DL (ref 70–99)
HCT VFR BLD AUTO: 41.5 %
HCT VFR BLD CALC: 41.5 % (ref 39–53)
HGB BLD-MCNC: 13.6 G/DL
HGB BLD-MCNC: 13.6 G/DL (ref 13–17.5)
IMM GRANULOCYTES # BLD AUTO: 0.02 X10(3) UL (ref 0–1)
IMM GRANULOCYTES NFR BLD: 0.3 %
IMMATURE GRANULOCYTES (OFFPRE25): 0.3 %
LENGTH OF FAST TIME PATIENT: YES H
LYMPHOCYTES # BLD AUTO: 1.83 X10(3) UL (ref 1–4)
LYMPHOCYTES # BLD: 1.83 X10(3) UL (ref 1–4)
LYMPHOCYTES NFR BLD AUTO: 28.6 %
LYMPHOCYTES NFR BLD: 28.6 %
MCH RBC QN AUTO: 33.2 PG (ref 26–34)
MCH RBC QN AUTO: 33.2 PG (ref 26–34)
MCHC RBC AUTO-ENTMCNC: 32.8 G/DL (ref 31–37)
MCHC RBC AUTO-ENTMCNC: 32.8 G/DL (ref 31–37)
MCV RBC AUTO: 101.2 FL
MCV RBC AUTO: 101.2 FL (ref 80–100)
MONOCYTES # BLD AUTO: 0.62 X10(3) UL (ref 0.1–1)
MONOCYTES # BLD: 0.62 X10(3) UL (ref 0.1–1)
MONOCYTES NFR BLD AUTO: 9.7 %
MONOCYTES NFR BLD: 9.7 %
NEUTROPHILS # BLD AUTO: 3.63 X10 (3) UL (ref 1.5–7.7)
NEUTROPHILS # BLD AUTO: 3.63 X10(3) UL (ref 1.5–7.7)
NEUTROPHILS # BLD: 3.63 X10(3) UL (ref 1.5–7.7)
NEUTROPHILS NFR BLD AUTO: 56.9 %
NEUTROPHILS NFR BLD: 56.9 %
OSMOLALITY SERPL CALC.SUM OF ELEC: 292 MOSM/KG (ref 275–295)
P AXIS: 21 DEGREES
P-R INTERVAL: 168 MS
PLATELET # BLD AUTO: 156 10(3)UL (ref 150–450)
PLATELET # BLD: 156 X10(3) UL (ref 150–450)
POTASSIUM SERPL-SCNC: 4 MMOL/L (ref 3.5–5.1)
POTASSIUM SERPL-SCNC: 4 MMOL/L (ref 3.5–5.1)
PROT SERPL-MCNC: 7.5 G/DL (ref 6.4–8.2)
PROT SERPL-MCNC: 7.5 G/DL (ref 6.4–8.2)
Q-T INTERVAL: 402 MS
QRS DURATION: 90 MS
QTC CALCULATION (BEZET): 418 MS
R AXIS: 35 DEGREES
RBC # BLD AUTO: 4.1 X10(6)UL
RBC # BLD: 4.1 X10(6)UL (ref 4.3–5.7)
SODIUM SERPL-SCNC: 140 MMOL/L (ref 136–145)
SODIUM SERPL-SCNC: 140 MMOL/L (ref 136–145)
T AXIS: 37 DEGREES
VENTRICULAR RATE: 65 BPM
WBC # BLD AUTO: 6.4 X10(3) UL (ref 4–11)
WBC # BLD: 6.4 X10(3) UL (ref 4–11)

## 2023-03-04 PROCEDURE — 36415 COLL VENOUS BLD VENIPUNCTURE: CPT

## 2023-03-04 PROCEDURE — 85025 COMPLETE CBC W/AUTO DIFF WBC: CPT

## 2023-03-04 PROCEDURE — 93010 ELECTROCARDIOGRAM REPORT: CPT | Performed by: STUDENT IN AN ORGANIZED HEALTH CARE EDUCATION/TRAINING PROGRAM

## 2023-03-04 PROCEDURE — 93005 ELECTROCARDIOGRAM TRACING: CPT

## 2023-03-04 PROCEDURE — 80053 COMPREHEN METABOLIC PANEL: CPT

## 2023-03-07 ENCOUNTER — HOSPITAL ENCOUNTER (OUTPATIENT)
Age: 38
Discharge: HOME | End: 2023-03-07
Attending: INTERNAL MEDICINE | Admitting: INTERNAL MEDICINE

## 2023-03-07 VITALS
HEART RATE: 55 BPM | WEIGHT: 243.61 LBS | RESPIRATION RATE: 17 BRPM | HEIGHT: 74 IN | OXYGEN SATURATION: 96 % | BODY MASS INDEX: 31.26 KG/M2 | SYSTOLIC BLOOD PRESSURE: 123 MMHG | TEMPERATURE: 97.5 F | DIASTOLIC BLOOD PRESSURE: 63 MMHG

## 2023-03-07 PROCEDURE — 10002805 HB CONTRAST AGENT: Performed by: INTERNAL MEDICINE

## 2023-03-07 PROCEDURE — 99153 MOD SED SAME PHYS/QHP EA: CPT | Performed by: INTERNAL MEDICINE

## 2023-03-07 PROCEDURE — 10002800 HB RX 250 W HCPCS: Performed by: INTERNAL MEDICINE

## 2023-03-07 PROCEDURE — 99152 MOD SED SAME PHYS/QHP 5/>YRS: CPT | Performed by: INTERNAL MEDICINE

## 2023-03-07 PROCEDURE — C1887 CATHETER, GUIDING: HCPCS | Performed by: INTERNAL MEDICINE

## 2023-03-07 PROCEDURE — C1894 INTRO/SHEATH, NON-LASER: HCPCS | Performed by: INTERNAL MEDICINE

## 2023-03-07 PROCEDURE — 10002801 HB RX 250 W/O HCPCS: Performed by: INTERNAL MEDICINE

## 2023-03-07 PROCEDURE — 10002803 HB RX 637

## 2023-03-07 PROCEDURE — C1769 GUIDE WIRE: HCPCS | Performed by: INTERNAL MEDICINE

## 2023-03-07 PROCEDURE — 10002807 HB RX 258: Performed by: INTERNAL MEDICINE

## 2023-03-07 PROCEDURE — 93460 R&L HRT ART/VENTRICLE ANGIO: CPT | Performed by: INTERNAL MEDICINE

## 2023-03-07 PROCEDURE — 13000001 HB PHASE II RECOVERY EA 30 MINUTES: Performed by: INTERNAL MEDICINE

## 2023-03-07 PROCEDURE — 10006023 HB SUPPLY 272: Performed by: INTERNAL MEDICINE

## 2023-03-07 RX ORDER — SPIRONOLACTONE 25 MG/1
12.5 TABLET ORAL DAILY
Qty: 45 TABLET | Refills: 3 | Status: SHIPPED | OUTPATIENT
Start: 2023-03-07 | End: 2024-03-06

## 2023-03-07 RX ORDER — LOSARTAN POTASSIUM 50 MG/1
50 TABLET ORAL DAILY
Qty: 30 TABLET | Refills: 0 | Status: SHIPPED | OUTPATIENT
Start: 2023-03-07 | End: 2023-04-06

## 2023-03-07 RX ORDER — 0.9 % SODIUM CHLORIDE 0.9 %
2 VIAL (ML) INJECTION EVERY 12 HOURS SCHEDULED
Status: DISCONTINUED | OUTPATIENT
Start: 2023-03-07 | End: 2023-03-07 | Stop reason: HOSPADM

## 2023-03-07 RX ORDER — SODIUM CHLORIDE 9 MG/ML
INJECTION, SOLUTION INTRAVENOUS CONTINUOUS
Status: DISCONTINUED | OUTPATIENT
Start: 2023-03-07 | End: 2023-03-07 | Stop reason: HOSPADM

## 2023-03-07 RX ORDER — SPIRONOLACTONE 25 MG/1
12.5 TABLET ORAL DAILY
Status: DISCONTINUED | OUTPATIENT
Start: 2023-03-07 | End: 2023-03-07 | Stop reason: HOSPADM

## 2023-03-07 RX ORDER — LIDOCAINE HYDROCHLORIDE 10 MG/ML
1 INJECTION, SOLUTION INFILTRATION; PERINEURAL PRN
Status: DISCONTINUED | OUTPATIENT
Start: 2023-03-07 | End: 2023-03-07 | Stop reason: HOSPADM

## 2023-03-07 RX ORDER — NITROGLYCERIN 0.4 MG/1
0.4 TABLET SUBLINGUAL EVERY 5 MIN PRN
Status: DISCONTINUED | OUTPATIENT
Start: 2023-03-07 | End: 2023-03-07 | Stop reason: HOSPADM

## 2023-03-07 RX ORDER — CLONIDINE HYDROCHLORIDE 0.1 MG/1
0.1 TABLET ORAL EVERY 4 HOURS PRN
Status: DISCONTINUED | OUTPATIENT
Start: 2023-03-07 | End: 2023-03-07 | Stop reason: HOSPADM

## 2023-03-07 RX ORDER — SODIUM CHLORIDE 9 MG/ML
INJECTION, SOLUTION INTRAVENOUS CONTINUOUS PRN
Status: DISCONTINUED | OUTPATIENT
Start: 2023-03-07 | End: 2023-03-07 | Stop reason: HOSPADM

## 2023-03-07 RX ORDER — HEPARIN SODIUM 1000 [USP'U]/ML
INJECTION, SOLUTION INTRAVENOUS; SUBCUTANEOUS PRN
Status: DISCONTINUED | OUTPATIENT
Start: 2023-03-07 | End: 2023-03-07 | Stop reason: HOSPADM

## 2023-03-07 RX ORDER — ACETAMINOPHEN 650 MG/1
650 SUPPOSITORY RECTAL EVERY 4 HOURS PRN
Status: DISCONTINUED | OUTPATIENT
Start: 2023-03-07 | End: 2023-03-07 | Stop reason: HOSPADM

## 2023-03-07 RX ORDER — HYDRALAZINE HYDROCHLORIDE 20 MG/ML
10 INJECTION INTRAMUSCULAR; INTRAVENOUS EVERY 4 HOURS PRN
Status: DISCONTINUED | OUTPATIENT
Start: 2023-03-07 | End: 2023-03-07 | Stop reason: HOSPADM

## 2023-03-07 RX ORDER — ACETAMINOPHEN 325 MG/1
650 TABLET ORAL EVERY 4 HOURS PRN
Status: DISCONTINUED | OUTPATIENT
Start: 2023-03-07 | End: 2023-03-07 | Stop reason: HOSPADM

## 2023-03-07 RX ORDER — LIDOCAINE HYDROCHLORIDE 20 MG/ML
INJECTION, SOLUTION EPIDURAL; INFILTRATION; INTRACAUDAL; PERINEURAL PRN
Status: DISCONTINUED | OUTPATIENT
Start: 2023-03-07 | End: 2023-03-07 | Stop reason: HOSPADM

## 2023-03-07 RX ORDER — MIDAZOLAM HYDROCHLORIDE 1 MG/ML
1 INJECTION, SOLUTION INTRAMUSCULAR; INTRAVENOUS PRN
Status: DISCONTINUED | OUTPATIENT
Start: 2023-03-07 | End: 2023-03-07 | Stop reason: HOSPADM

## 2023-03-07 RX ORDER — MIDAZOLAM HYDROCHLORIDE 1 MG/ML
INJECTION, SOLUTION INTRAMUSCULAR; INTRAVENOUS PRN
Status: DISCONTINUED | OUTPATIENT
Start: 2023-03-07 | End: 2023-03-07 | Stop reason: HOSPADM

## 2023-03-07 RX ORDER — IODIXANOL 320 MG/ML
INJECTION, SOLUTION INTRAVASCULAR PRN
Status: DISCONTINUED | OUTPATIENT
Start: 2023-03-07 | End: 2023-03-07 | Stop reason: HOSPADM

## 2023-03-07 RX ORDER — DIPHENHYDRAMINE HYDROCHLORIDE 50 MG/ML
INJECTION INTRAMUSCULAR; INTRAVENOUS PRN
Status: DISCONTINUED | OUTPATIENT
Start: 2023-03-07 | End: 2023-03-07 | Stop reason: HOSPADM

## 2023-03-07 ASSESSMENT — COLUMBIA-SUICIDE SEVERITY RATING SCALE - C-SSRS
1. WITHIN THE PAST MONTH, HAVE YOU WISHED YOU WERE DEAD OR WISHED YOU COULD GO TO SLEEP AND NOT WAKE UP?: NO
2. HAVE YOU ACTUALLY HAD ANY THOUGHTS OF KILLING YOURSELF?: NO
6. HAVE YOU EVER DONE ANYTHING, STARTED TO DO ANYTHING, OR PREPARED TO DO ANYTHING TO END YOUR LIFE?: NO
IS THE PATIENT ABLE TO COMPLETE C-SSRS: YES

## 2023-03-07 ASSESSMENT — PAIN SCALES - GENERAL
PAINLEVEL_OUTOF10: 0

## 2023-03-07 ASSESSMENT — PATIENT HEALTH QUESTIONNAIRE - PHQ9
SUM OF ALL RESPONSES TO PHQ9 QUESTIONS 1 AND 2: 0
CLINICAL INTERPRETATION OF PHQ2 SCORE: NO FURTHER SCREENING NEEDED
IS PATIENT ABLE TO COMPLETE PHQ2 OR PHQ9: YES
SUM OF ALL RESPONSES TO PHQ9 QUESTIONS 1 AND 2: 0
2. FEELING DOWN, DEPRESSED OR HOPELESS: NOT AT ALL
1. LITTLE INTEREST OR PLEASURE IN DOING THINGS: NOT AT ALL

## 2023-04-21 ENCOUNTER — APPOINTMENT (OUTPATIENT)
Dept: ENDOCRINOLOGY | Age: 38
End: 2023-04-21

## 2024-01-24 ENCOUNTER — APPOINTMENT (OUTPATIENT)
Dept: UROLOGY | Age: 39
End: 2024-01-24

## 2024-01-24 VITALS
DIASTOLIC BLOOD PRESSURE: 91 MMHG | HEART RATE: 89 BPM | SYSTOLIC BLOOD PRESSURE: 153 MMHG | BODY MASS INDEX: 31.28 KG/M2 | HEIGHT: 74 IN

## 2024-01-24 DIAGNOSIS — Z30.09 STERILIZATION CONSULT: Primary | ICD-10-CM

## 2024-01-24 PROCEDURE — 99202 OFFICE O/P NEW SF 15 MIN: CPT | Performed by: UROLOGY

## 2024-01-24 RX ORDER — DIAZEPAM 10 MG/1
TABLET ORAL
Qty: 1 TABLET | Refills: 0 | Status: SHIPPED | OUTPATIENT
Start: 2024-01-24

## 2024-02-09 ENCOUNTER — APPOINTMENT (OUTPATIENT)
Dept: UROLOGY | Age: 39
End: 2024-02-09

## 2024-02-09 VITALS
DIASTOLIC BLOOD PRESSURE: 82 MMHG | SYSTOLIC BLOOD PRESSURE: 135 MMHG | HEIGHT: 74 IN | HEART RATE: 89 BPM | BODY MASS INDEX: 31.28 KG/M2

## 2024-02-09 DIAGNOSIS — Z30.2 ENCOUNTER FOR STERILIZATION: Primary | ICD-10-CM

## 2024-02-09 DIAGNOSIS — R39.9 GU (GENITOURINARY) SYMPTOMS: ICD-10-CM

## 2024-02-09 RX ORDER — LEVOFLOXACIN 500 MG/1
500 TABLET, FILM COATED ORAL ONCE
Status: COMPLETED | OUTPATIENT
Start: 2024-02-09 | End: 2024-02-09

## 2024-02-09 RX ADMIN — LEVOFLOXACIN 500 MG: 500 TABLET, FILM COATED ORAL at 09:27

## 2024-02-15 LAB
ASR DISCLAIMER: NORMAL
CASE RPRT: NORMAL
CLINICAL INFO: NORMAL
PATH REPORT.FINAL DX SPEC: NORMAL
PATH REPORT.GROSS SPEC: NORMAL
PATH REPORT.MICROSCOPIC SPEC OTHER STN: NORMAL

## 2024-04-22 ENCOUNTER — LAB SERVICES (OUTPATIENT)
Dept: LAB | Age: 39
End: 2024-04-22

## 2024-04-22 DIAGNOSIS — Z30.2 ENCOUNTER FOR STERILIZATION: ICD-10-CM

## 2024-04-22 LAB — SPERM P VAS #/AREA SMN HPF: NORMAL /HPF

## 2024-04-22 PROCEDURE — 89321 SEMEN ANAL SPERM DETECTION: CPT | Performed by: INTERNAL MEDICINE

## 2024-04-30 ENCOUNTER — TELEPHONE (OUTPATIENT)
Dept: UROLOGY | Age: 39
End: 2024-04-30

## 2024-12-30 ENCOUNTER — WALK IN (OUTPATIENT)
Dept: URGENT CARE | Age: 39
End: 2024-12-30

## 2024-12-30 VITALS
WEIGHT: 225.8 LBS | DIASTOLIC BLOOD PRESSURE: 68 MMHG | HEIGHT: 74 IN | BODY MASS INDEX: 28.98 KG/M2 | TEMPERATURE: 97.8 F | RESPIRATION RATE: 18 BRPM | OXYGEN SATURATION: 96 % | SYSTOLIC BLOOD PRESSURE: 132 MMHG | HEART RATE: 70 BPM

## 2024-12-30 DIAGNOSIS — J02.9 SORE THROAT: Primary | ICD-10-CM

## 2024-12-30 DIAGNOSIS — J06.9 VIRAL URI: ICD-10-CM

## 2024-12-30 LAB
FLUAV AG UPPER RESP QL IA.RAPID: NEGATIVE
FLUBV AG UPPER RESP QL IA.RAPID: NEGATIVE
INTERNAL PROCEDURAL CONTROLS ACCEPTABLE: YES
S PYO AG THROAT QL IA.RAPID: NEGATIVE
SARS-COV+SARS-COV-2 AG RESP QL IA.RAPID: NOT DETECTED
TEST LOT EXPIRATION DATE: NORMAL
TEST LOT NUMBER: NORMAL

## 2024-12-30 PROCEDURE — 87081 CULTURE SCREEN ONLY: CPT | Performed by: CLINICAL MEDICAL LABORATORY

## 2024-12-30 ASSESSMENT — ENCOUNTER SYMPTOMS
SORE THROAT: 1
TROUBLE SWALLOWING: 1

## 2024-12-30 ASSESSMENT — PAIN SCALES - GENERAL: PAINLEVEL: 2

## 2025-01-02 LAB — S PYO SPEC QL CULT: NORMAL

## (undated) DEVICE — GUIDEWIRE WHOLEY 260CM MOD J CRV .035CM VASC PERIPH INTMD

## (undated) DEVICE — CATHETER 5FR FL4 CRV 100CM 2 WIRE BRAID STIFF PROX SHAFT

## (undated) DEVICE — SOL  .9 1000ML BTL

## (undated) DEVICE — SUCTION CANISTER, 3000CC,SAFELINER: Brand: DEROYAL

## (undated) DEVICE — STERILE LATEX POWDER-FREE SURGICAL GLOVES WITH HYDROGEL COATING, SMOOTH FINISH, STRAIGHT FINGER: Brand: PROTEXIS

## (undated) DEVICE — HEMOSTAT CMPR VASC REG 24CM

## (undated) DEVICE — Device

## (undated) DEVICE — NASAL ACCESSORY: Brand: MEDLINE INDUSTRIES, INC.

## (undated) DEVICE — CATH IMPULSE FR4 6F 100CM

## (undated) DEVICE — CODMAN® SURGICAL PATTIES 1" X 3" (2.54CM X 7.62CM): Brand: CODMAN®

## (undated) DEVICE — KIT INTRO 5FR 21GA 10CM 7CM 0.018IN MINI ACC ECHO ENH NDL

## (undated) DEVICE — INTRODUCER SHTH 7FR 50CM 12CM GW HEMOSTASIS VLV SDPRT DIL

## (undated) DEVICE — AGENT HMST STRL KT MTRX THRMB

## (undated) DEVICE — HEAD & NECK: Brand: MEDLINE INDUSTRIES, INC.

## (undated) DEVICE — EYE PADSSTERILENOT MADE WITH NATURAL RUBBER LATEXSINGLE USE ONLYDO NOT USE IF PACKAGE OPENED OR DAMAGED: Brand: CARDINAL HEALTH

## (undated) DEVICE — KIT INTRO 6FR 21GA 10CM 45CM .021IN 35MM FLEX STRGT SHTH DIL

## (undated) NOTE — ED AVS SNAPSHOT
Anurag Salazar   MRN: [id number]    Department:  [facility] Emergency Department   Date of Visit:  6/5/2018           Disclosure     Insurance plans vary and the physician(s) referred by the ER may not be covered by your plan.  Please contact CARE PHYSICIAN AT ONCE OR RETURN IMMEDIATELY TO THE EMERGENCY DEPARTMENT. If you have been prescribed any medication(s), please fill your prescription right away and begin taking the medication(s) as directed.   If you believe that any of the medications

## (undated) NOTE — LETTER
Donavon Dance, Md  [address], [address]       06/19/18        Patient: Nathaniel Chiang   YOB: 1985   Date of Visit: 6/19/2018       Dear  Dr. Aníbal Sweet MD,      Thank you for referring Nathaniel Chiang to my p

## (undated) NOTE — MR AVS SNAPSHOT
[address] [facility] [address] [place] [address]  [phone]               Thank you for choosing us for your health care visit with Todd Grove MD.  We are glad to serve you and happy to provide you with this summary of your visit.   Ple may be held responsible for payment in full if proper authorization is not acquired. Please contact the Patient Business Office at [phone] if you have any questions related to insurance coverage. Thank you.        Thursday March 16, 2017     Imaging: [address]  [address]  [address] [facility], [address] [facility]    [address]  [address], [address] [place] [address]    It is the patient's responsibility to check with and follow their insurance Summaries. If you've been to the Emergency Department or your doctor's office, you can view your past visit information in [employer] by going to Visits < Visit Summaries. [employer] questions? Call [phone] for help.   [employer] is NOT to be used for urge Water is best for hydration Fast food. Eat at home when possible     Tips for increasing your physical activity – Adults who are physically active are less likely to develop some chronic diseases than adults who are inactive.      HOW TO GET STARTED: HOW

## (undated) NOTE — ED AVS SNAPSHOT
Herson Greene   MRN: [id number]    Department:  [facility] Emergency Department   Date of Visit:  7/3/2018           Disclosure     Insurance plans vary and the physician(s) referred by the ER may not be covered by your plan.  Please contact CARE PHYSICIAN AT ONCE OR RETURN IMMEDIATELY TO THE EMERGENCY DEPARTMENT. If you have been prescribed any medication(s), please fill your prescription right away and begin taking the medication(s) as directed.   If you believe that any of the medications